# Patient Record
Sex: FEMALE | Race: OTHER | HISPANIC OR LATINO
[De-identification: names, ages, dates, MRNs, and addresses within clinical notes are randomized per-mention and may not be internally consistent; named-entity substitution may affect disease eponyms.]

---

## 2022-06-14 PROBLEM — Z00.00 ENCOUNTER FOR PREVENTIVE HEALTH EXAMINATION: Status: ACTIVE | Noted: 2022-06-14

## 2022-06-22 ENCOUNTER — APPOINTMENT (OUTPATIENT)
Dept: OBGYN | Facility: CLINIC | Age: 37
End: 2022-06-22
Payer: COMMERCIAL

## 2022-06-22 VITALS
WEIGHT: 129 LBS | SYSTOLIC BLOOD PRESSURE: 118 MMHG | DIASTOLIC BLOOD PRESSURE: 68 MMHG | BODY MASS INDEX: 24.35 KG/M2 | HEIGHT: 61 IN

## 2022-06-22 DIAGNOSIS — Z13.0 ENCOUNTER FOR SCREENING FOR DISEASES OF THE BLOOD AND BLOOD-FORMING ORGANS AND CERTAIN DISORDERS INVOLVING THE IMMUNE MECHANISM: ICD-10-CM

## 2022-06-22 DIAGNOSIS — Z01.84 ENCOUNTER FOR ANTIBODY RESPONSE EXAMINATION: ICD-10-CM

## 2022-06-22 DIAGNOSIS — Z13.21 ENCOUNTER FOR SCREENING FOR NUTRITIONAL DISORDER: ICD-10-CM

## 2022-06-22 DIAGNOSIS — R11.0 NAUSEA: ICD-10-CM

## 2022-06-22 DIAGNOSIS — Z12.4 ENCOUNTER FOR SCREENING FOR MALIGNANT NEOPLASM OF CERVIX: ICD-10-CM

## 2022-06-22 DIAGNOSIS — Z32.01 ENCOUNTER FOR PREGNANCY TEST, RESULT POSITIVE: ICD-10-CM

## 2022-06-22 DIAGNOSIS — Z13.29 ENCOUNTER FOR SCREENING FOR OTHER SUSPECTED ENDOCRINE DISORDER: ICD-10-CM

## 2022-06-22 DIAGNOSIS — Z13.1 ENCOUNTER FOR SCREENING FOR DIABETES MELLITUS: ICD-10-CM

## 2022-06-22 DIAGNOSIS — Z01.411 ENCOUNTER FOR GYNECOLOGICAL EXAMINATION (GENERAL) (ROUTINE) WITH ABNORMAL FINDINGS: ICD-10-CM

## 2022-06-22 DIAGNOSIS — Z13.88 ENCOUNTER FOR SCREENING FOR DISORDER DUE TO EXPOSURE TO CONTAMINANTS: ICD-10-CM

## 2022-06-22 DIAGNOSIS — Z11.51 ENCOUNTER FOR SCREENING FOR HUMAN PAPILLOMAVIRUS (HPV): ICD-10-CM

## 2022-06-22 DIAGNOSIS — N91.1 SECONDARY AMENORRHEA: ICD-10-CM

## 2022-06-22 DIAGNOSIS — Z11.3 ENCOUNTER FOR SCREENING FOR INFECTIONS WITH A PREDOMINANTLY SEXUAL MODE OF TRANSMISSION: ICD-10-CM

## 2022-06-22 DIAGNOSIS — Z12.39 ENCOUNTER FOR OTHER SCREENING FOR MALIGNANT NEOPLASM OF BREAST: ICD-10-CM

## 2022-06-22 PROCEDURE — 76817 TRANSVAGINAL US OBSTETRIC: CPT

## 2022-06-22 PROCEDURE — 99385 PREV VISIT NEW AGE 18-39: CPT

## 2022-06-22 PROCEDURE — 99203 OFFICE O/P NEW LOW 30 MIN: CPT | Mod: 25

## 2022-06-22 RX ORDER — PRENATAL NO.167/FOLIC ACID/DHA 0.4MG-25MG
0.4-25 TABLET,CHEWABLE ORAL
Refills: 0 | Status: ACTIVE | COMMUNITY

## 2022-06-22 NOTE — COUNSELING
[Nutrition/ Exercise/ Weight Management] : nutrition, exercise, weight management [Vitamins/Supplements] : vitamins/supplements [Breast Self Exam] : breast self exam [Contraception/ Emergency Contraception/ Safe Sexual Practices] : contraception, emergency contraception, safe sexual practices [Preconception Care/ Fertility options] : preconception care, fertility options [Pregnancy Options] : pregnancy options [Confidentiality] : confidentiality [STD (testing, results, tx)] : STD (testing, results, tx) [Lab Results] : lab results [Medication Management] : medication management [Other ___] : [unfilled]

## 2022-06-22 NOTE — PHYSICAL EXAM
[Chaperone Present] : A chaperone was present in the examining room during all aspects of the physical examination [FreeTextEntry1] : her partner was present for H&P as well as for exam [Appropriately responsive] : appropriately responsive [Alert] : alert [No Acute Distress] : no acute distress [No Lymphadenopathy] : no lymphadenopathy [Soft] : soft [Non-tender] : non-tender [Non-distended] : non-distended [No HSM] : No HSM [No Lesions] : no lesions [No Mass] : no mass [Oriented x3] : oriented x3 [Examination Of The Breasts] : a normal appearance [No Masses] : no breast masses were palpable [Labia Majora] : normal [Labia Minora] : normal [Normal] : normal [Uterine Adnexae] : normal

## 2022-06-22 NOTE — HISTORY OF PRESENT ILLNESS
[Patient reported PAP Smear was normal] : Patient reported PAP Smear was normal [Y] : Patient is sexually active [N] : Patient denies prior pregnancies [Frequency: Q ___ days] : menstrual periods occur approximately every [unfilled] days [No] : Patient does not have concerns regarding sex [Currently Active] : currently active [PapSmeardate] : 2021 [TextBox_31] : normal [LMPDate] : 5/4/22 [MensesFreq] : 31-61 [PGHxTotal] : 0 [TextBox_6] : 5/4/22 [TextBox_13] : 75-68 [FreeTextEntry1] : 5/4/22

## 2022-06-22 NOTE — PROCEDURE
[Cervical Pap Smear] : cervical Pap smear [Liquid Base] : liquid base [GC Chlamydia Culture] : GC Chlamydia Culture [Tolerated Well] : the patient tolerated the procedure well [No Complications] : there were no complications [Amenorrhea] : Amenorrhea [Transvaginal Ultrasound] : transvaginal ultrasound [FreeTextEntry3] : TVUS today shows single early iup CRL 7w0d c/w lmp dating +YS, +FHR

## 2022-06-22 NOTE — PLAN
[FreeTextEntry1] : 1.SECONDARY AMENORRHEA/EARLY IUP\par -TVUS today shows early IUP measuring c/w LMP of 5/4/22; Approx [place] of 2/8/23 ; +FHR, +YS, +FP\par - Early pregnancy precautions, practice /hospital info and folder provided to patient. \par -NOB blood panel, GCC cx, UCX, pap/hpv collected and sent at todays office visit. \par -Cont PNVs\par 2.GENETICS\par -SEMA4 expanded carrier screen drawn and sent at todays visit \par 3.AMA \par -Discussed NIPS, ultrascreen and sequential screening; Declines invasive testing at this time; aware of increased risk of aneuploidy with advanced age >35 \par -low dose ASA (81mg)x 1.5 starting @ 12 weeks discussed with for PEC prphlxis \par 4.NAUSEA\par  -small frequent meals advised \par -discussed Diclegis erx placed; pt able to tolerate po foods and fluids at this time\par 5. Pt denies hx at visit of STI; on review of her pt intake she noted +hx of "Herpes"; f/u @ [place] *most likely confidential information; possibility of starting Valrex @ 35 weeks if +HSV2 vaginal infections\par \par RTO 5 weeks for NOB, usc, NIPS, sono appt. \par \par During this visit 40 minutes were spent face-to-face with greater than 50% of the time dedicated to counseling.\par \par

## 2022-06-22 NOTE — REVIEW OF SYSTEMS
[Patient Intake Form Reviewed] : Patient intake form was reviewed [Fatigue] : fatigue [Nausea] : nausea [Negative] : Heme/Lymph [Vomiting] : no vomiting

## 2022-06-23 LAB
BASOPHILS # BLD AUTO: 0.02 K/UL
BASOPHILS NFR BLD AUTO: 0.2 %
EOSINOPHIL # BLD AUTO: 0.03 K/UL
EOSINOPHIL NFR BLD AUTO: 0.4 %
HCT VFR BLD CALC: 42.5 %
HGB BLD-MCNC: 13 G/DL
IMM GRANULOCYTES NFR BLD AUTO: 0.4 %
LYMPHOCYTES # BLD AUTO: 1.71 K/UL
LYMPHOCYTES NFR BLD AUTO: 20.1 %
MAN DIFF?: NORMAL
MCHC RBC-ENTMCNC: 29.3 PG
MCHC RBC-ENTMCNC: 30.6 GM/DL
MCV RBC AUTO: 95.9 FL
MONOCYTES # BLD AUTO: 0.56 K/UL
MONOCYTES NFR BLD AUTO: 6.6 %
NEUTROPHILS # BLD AUTO: 6.14 K/UL
NEUTROPHILS NFR BLD AUTO: 72.3 %
PLATELET # BLD AUTO: 266 K/UL
RBC # BLD: 4.43 M/UL
RBC # FLD: 14.6 %
WBC # FLD AUTO: 8.49 K/UL

## 2022-06-27 LAB
25(OH)D3 SERPL-MCNC: 32.6 NG/ML
ABO + RH PNL BLD: NORMAL
B19V IGG SER QL IA: 7.95 INDEX
B19V IGG+IGM SER-IMP: NORMAL
B19V IGG+IGM SER-IMP: POSITIVE
B19V IGM FLD-ACNC: 0.15 INDEX
B19V IGM SER-ACNC: NEGATIVE
BACTERIA UR CULT: NORMAL
BLD GP AB SCN SERPL QL: NORMAL
C TRACH RRNA SPEC QL NAA+PROBE: NOT DETECTED
ESTIMATED AVERAGE GLUCOSE: 100 MG/DL
HBA1C MFR BLD HPLC: 5.1 %
HBV SURFACE AG SER QL: NONREACTIVE
HGB A MFR BLD: 97.5 %
HGB A2 MFR BLD: 2.5 %
HGB FRACT BLD-IMP: NORMAL
HIV1+2 AB SPEC QL IA.RAPID: NONREACTIVE
HPV HIGH+LOW RISK DNA PNL CVX: NOT DETECTED
LEAD BLD-MCNC: <1 UG/DL
MEV IGG FLD QL IA: 144 AU/ML
MEV IGG+IGM SER-IMP: POSITIVE
MUV AB SER-ACNC: POSITIVE
MUV IGG SER QL IA: 102 AU/ML
N GONORRHOEA RRNA SPEC QL NAA+PROBE: NOT DETECTED
RUBV IGG FLD-ACNC: 7.1 INDEX
RUBV IGG SER-IMP: POSITIVE
SOURCE AMPLIFICATION: NORMAL
T PALLIDUM AB SER QL IA: NEGATIVE
TSH SERPL-ACNC: 1.85 UIU/ML
VZV AB TITR SER: POSITIVE
VZV IGG SER IF-ACNC: 948 INDEX

## 2022-06-29 DIAGNOSIS — O23.599 INFECTION OF OTHER PART OF GENITAL TRACT IN PREGNANCY, UNSPECIFIED TRIMESTER: ICD-10-CM

## 2022-06-29 DIAGNOSIS — B96.89 INFECTION OF OTHER PART OF GENITAL TRACT IN PREGNANCY, UNSPECIFIED TRIMESTER: ICD-10-CM

## 2022-06-29 LAB — CYTOLOGY CVX/VAG DOC THIN PREP: ABNORMAL

## 2022-06-30 RX ORDER — METRONIDAZOLE 7.5 MG/G
0.75 GEL VAGINAL
Qty: 1 | Refills: 0 | Status: ACTIVE | COMMUNITY
Start: 2022-06-29 | End: 1900-01-01

## 2022-07-20 DIAGNOSIS — B37.9 CANDIDIASIS, UNSPECIFIED: ICD-10-CM

## 2022-07-28 ENCOUNTER — ASOB RESULT (OUTPATIENT)
Age: 37
End: 2022-07-28

## 2022-07-28 ENCOUNTER — APPOINTMENT (OUTPATIENT)
Dept: OBGYN | Facility: CLINIC | Age: 37
End: 2022-07-28

## 2022-07-28 DIAGNOSIS — Z34.01 ENCOUNTER FOR SUPERVISION OF NORMAL FIRST PREGNANCY, FIRST TRIMESTER: ICD-10-CM

## 2022-07-28 PROCEDURE — 0500F INITIAL PRENATAL CARE VISIT: CPT

## 2022-07-28 PROCEDURE — 36415 COLL VENOUS BLD VENIPUNCTURE: CPT

## 2022-07-28 PROCEDURE — 76813 OB US NUCHAL MEAS 1 GEST: CPT

## 2022-07-28 PROCEDURE — 76801 OB US < 14 WKS SINGLE FETUS: CPT | Mod: 59

## 2022-08-01 ENCOUNTER — NON-APPOINTMENT (OUTPATIENT)
Age: 37
End: 2022-08-01

## 2022-08-01 ENCOUNTER — TRANSCRIPTION ENCOUNTER (OUTPATIENT)
Age: 37
End: 2022-08-01

## 2022-08-02 ENCOUNTER — NON-APPOINTMENT (OUTPATIENT)
Age: 37
End: 2022-08-02

## 2022-08-03 ENCOUNTER — NON-APPOINTMENT (OUTPATIENT)
Age: 37
End: 2022-08-03

## 2022-08-17 ENCOUNTER — NON-APPOINTMENT (OUTPATIENT)
Age: 37
End: 2022-08-17

## 2022-08-25 ENCOUNTER — APPOINTMENT (OUTPATIENT)
Dept: OBGYN | Facility: CLINIC | Age: 37
End: 2022-08-25

## 2022-08-25 PROCEDURE — 0502F SUBSEQUENT PRENATAL CARE: CPT

## 2022-08-25 PROCEDURE — 36415 COLL VENOUS BLD VENIPUNCTURE: CPT

## 2022-08-29 ENCOUNTER — NON-APPOINTMENT (OUTPATIENT)
Age: 37
End: 2022-08-29

## 2022-09-01 ENCOUNTER — TRANSCRIPTION ENCOUNTER (OUTPATIENT)
Age: 37
End: 2022-09-01

## 2022-09-01 LAB — AFP PNL SERPL: NORMAL

## 2022-09-15 ENCOUNTER — ASOB RESULT (OUTPATIENT)
Age: 37
End: 2022-09-15

## 2022-09-15 ENCOUNTER — APPOINTMENT (OUTPATIENT)
Dept: ANTEPARTUM | Facility: CLINIC | Age: 37
End: 2022-09-15

## 2022-09-15 PROCEDURE — 76811 OB US DETAILED SNGL FETUS: CPT

## 2022-09-16 ENCOUNTER — TRANSCRIPTION ENCOUNTER (OUTPATIENT)
Age: 37
End: 2022-09-16

## 2022-09-21 ENCOUNTER — NON-APPOINTMENT (OUTPATIENT)
Age: 37
End: 2022-09-21

## 2022-09-22 ENCOUNTER — NON-APPOINTMENT (OUTPATIENT)
Age: 37
End: 2022-09-22

## 2022-09-22 ENCOUNTER — APPOINTMENT (OUTPATIENT)
Dept: OBGYN | Facility: CLINIC | Age: 37
End: 2022-09-22

## 2022-09-22 DIAGNOSIS — Z34.02 ENCOUNTER FOR SUPERVISION OF NORMAL FIRST PREGNANCY, SECOND TRIMESTER: ICD-10-CM

## 2022-09-22 PROCEDURE — 0502F SUBSEQUENT PRENATAL CARE: CPT

## 2022-10-05 ENCOUNTER — TRANSCRIPTION ENCOUNTER (OUTPATIENT)
Age: 37
End: 2022-10-05

## 2022-10-24 ENCOUNTER — NON-APPOINTMENT (OUTPATIENT)
Age: 37
End: 2022-10-24

## 2022-10-25 RX ORDER — ONDANSETRON 4 MG/1
4 TABLET, ORALLY DISINTEGRATING ORAL EVERY 6 HOURS
Qty: 20 | Refills: 1 | Status: ACTIVE | COMMUNITY
Start: 2022-10-25 | End: 1900-01-01

## 2022-10-31 ENCOUNTER — NON-APPOINTMENT (OUTPATIENT)
Age: 37
End: 2022-10-31

## 2022-11-03 ENCOUNTER — APPOINTMENT (OUTPATIENT)
Dept: OBGYN | Facility: CLINIC | Age: 37
End: 2022-11-03

## 2022-11-03 DIAGNOSIS — N39.0 URINARY TRACT INFECTION, SITE NOT SPECIFIED: ICD-10-CM

## 2022-11-03 DIAGNOSIS — Z23 ENCOUNTER FOR IMMUNIZATION: ICD-10-CM

## 2022-11-03 PROCEDURE — 36415 COLL VENOUS BLD VENIPUNCTURE: CPT

## 2022-11-03 PROCEDURE — 90471 IMMUNIZATION ADMIN: CPT

## 2022-11-03 PROCEDURE — 90686 IIV4 VACC NO PRSV 0.5 ML IM: CPT

## 2022-11-03 PROCEDURE — 0502F SUBSEQUENT PRENATAL CARE: CPT

## 2022-11-04 LAB
25(OH)D3 SERPL-MCNC: 36 NG/ML
BASOPHILS # BLD AUTO: 0.03 K/UL
BASOPHILS NFR BLD AUTO: 0.3 %
BLD GP AB SCN SERPL QL: NORMAL
EOSINOPHIL # BLD AUTO: 0.11 K/UL
EOSINOPHIL NFR BLD AUTO: 1.1 %
FERRITIN SERPL-MCNC: 42 NG/ML
GLUCOSE 1H P 50 G GLC PO SERPL-MCNC: 154 MG/DL
HCT VFR BLD CALC: 36.2 %
HGB BLD-MCNC: 11.9 G/DL
HIV1+2 AB SPEC QL IA.RAPID: NONREACTIVE
IMM GRANULOCYTES NFR BLD AUTO: 0.6 %
LYMPHOCYTES # BLD AUTO: 1.6 K/UL
LYMPHOCYTES NFR BLD AUTO: 15.9 %
MAN DIFF?: NORMAL
MCHC RBC-ENTMCNC: 29.6 PG
MCHC RBC-ENTMCNC: 32.9 GM/DL
MCV RBC AUTO: 90 FL
MONOCYTES # BLD AUTO: 0.54 K/UL
MONOCYTES NFR BLD AUTO: 5.4 %
NEUTROPHILS # BLD AUTO: 7.74 K/UL
NEUTROPHILS NFR BLD AUTO: 76.7 %
PLATELET # BLD AUTO: 295 K/UL
RBC # BLD: 4.02 M/UL
RBC # FLD: 13.7 %
T PALLIDUM AB SER QL IA: NEGATIVE
WBC # FLD AUTO: 10.08 K/UL

## 2022-11-06 ENCOUNTER — TRANSCRIPTION ENCOUNTER (OUTPATIENT)
Age: 37
End: 2022-11-06

## 2022-11-06 LAB — BACTERIA UR CULT: NORMAL

## 2022-11-07 ENCOUNTER — APPOINTMENT (OUTPATIENT)
Dept: UROGYNECOLOGY | Facility: CLINIC | Age: 37
End: 2022-11-07

## 2022-11-07 ENCOUNTER — TRANSCRIPTION ENCOUNTER (OUTPATIENT)
Age: 37
End: 2022-11-07

## 2022-11-07 PROCEDURE — 99204 OFFICE O/P NEW MOD 45 MIN: CPT

## 2022-11-07 NOTE — DISCUSSION/SUMMARY
[FreeTextEntry1] : \par Aleyda presents with a skenes abscess vs infected urethral diverticulum. \par -Cultures collected\par -Recommend antibiotic treatment. Discussed with her OB, Dr Rodriguez. Will give Augmentin 865mg bid x 7 days\par -Probiotic daily while on abx\par -RTO in 2 weeks for follow up and repeat exam

## 2022-11-07 NOTE — PHYSICAL EXAM
[Chaperone Present] : A chaperone was present in the examining room during all aspects of the physical examination [FreeTextEntry1] : General: Well, appearing. Alert and orientated. No acute distress\par HEENT: Normocephalic, atraumatic and extraocular muscles appear to be intact \par Neck: Full range of motion, no obvious lymphadenopathy, deformities, or masses noted \par Respiratory: Speaking in full sentences comfortably, normal work of breathing and no cough during visit\par Musculoskeletal: active full range of motion in extremities \par Extremities: No upper extremity edema noted\par Skin: no obvious rash or skin lesions\par Neuro: Orientated X 3, speech is fluent, normal rate\par Psych: Normal mood and affect \par  \par \par EXAM performed with sterile gloves [Tenderness] : ~T no ~M abdominal tenderness observed [Distended] : not distended [FreeTextEntry3] : 3cm periurethral cyst, erythematous. purulent drainage from meatus with compression of cyst; culture collected

## 2022-11-07 NOTE — HISTORY OF PRESENT ILLNESS
[] : days ago [Unable To Restrain Bowel Movement] : no [Urinary Tract Infection] : no [FreeTextEntry5] : painful to the touch [de-identified] : 7 [FreeTextEntry1] : Aleyda is a 37 old, currently 26 weeks pregnant, referred for evaluation of vaginal cyst.  She first noted the cyst about one week ago.  It is painful to the touch and causes pain during urination and bowel movements.  She denies any vaginal bleeding.  Denies urgency, UUI, RINA.  Of note, during pregnancy, she has had BV and yeast infections.

## 2022-11-08 ENCOUNTER — NON-APPOINTMENT (OUTPATIENT)
Age: 37
End: 2022-11-08

## 2022-11-09 DIAGNOSIS — R73.09 OTHER ABNORMAL GLUCOSE: ICD-10-CM

## 2022-11-10 ENCOUNTER — NON-APPOINTMENT (OUTPATIENT)
Age: 37
End: 2022-11-10

## 2022-11-21 ENCOUNTER — APPOINTMENT (OUTPATIENT)
Dept: UROGYNECOLOGY | Facility: CLINIC | Age: 37
End: 2022-11-21

## 2022-11-21 PROCEDURE — 99213 OFFICE O/P EST LOW 20 MIN: CPT

## 2022-11-21 NOTE — DISCUSSION/SUMMARY
[FreeTextEntry1] : \par Exam findings and culture results reviewed. Symptoms and clinical exam improved s/p antibiotics. No sign of further infection on exam. Precautions reviewed. Will RTO post-partum for repeat evaluation and surgical planning if desired at that time.

## 2022-11-22 ENCOUNTER — TRANSCRIPTION ENCOUNTER (OUTPATIENT)
Age: 37
End: 2022-11-22

## 2022-12-01 ENCOUNTER — NON-APPOINTMENT (OUTPATIENT)
Age: 37
End: 2022-12-01

## 2022-12-01 ENCOUNTER — APPOINTMENT (OUTPATIENT)
Dept: OBGYN | Facility: CLINIC | Age: 37
End: 2022-12-01

## 2022-12-01 PROCEDURE — 0502F SUBSEQUENT PRENATAL CARE: CPT

## 2022-12-01 PROCEDURE — 90715 TDAP VACCINE 7 YRS/> IM: CPT

## 2022-12-01 PROCEDURE — 90471 IMMUNIZATION ADMIN: CPT

## 2022-12-02 ENCOUNTER — TRANSCRIPTION ENCOUNTER (OUTPATIENT)
Age: 37
End: 2022-12-02

## 2022-12-02 LAB
CANDIDA VAG CYTO: DETECTED
G VAGINALIS+PREV SP MTYP VAG QL MICRO: NOT DETECTED
T VAGINALIS VAG QL WET PREP: NOT DETECTED

## 2022-12-05 ENCOUNTER — TRANSCRIPTION ENCOUNTER (OUTPATIENT)
Age: 37
End: 2022-12-05

## 2022-12-15 ENCOUNTER — NON-APPOINTMENT (OUTPATIENT)
Age: 37
End: 2022-12-15

## 2022-12-15 LAB
A VAGINAE DNA VAG QL NAA+PROBE: NORMAL
BVAB2 DNA VAG QL NAA+PROBE: NORMAL
C KRUSEI DNA VAG QL NAA+PROBE: NEGATIVE
MEGA1 DNA VAG QL NAA+PROBE: ABNORMAL
MYCOPLASMA HOMINIS CULTURE: NEGATIVE
T VAGINALIS RRNA SPEC QL NAA+PROBE: NEGATIVE
UREAPLASMA CULTURE: POSITIVE

## 2022-12-16 ENCOUNTER — TRANSCRIPTION ENCOUNTER (OUTPATIENT)
Age: 37
End: 2022-12-16

## 2022-12-21 ENCOUNTER — APPOINTMENT (OUTPATIENT)
Dept: OBGYN | Facility: CLINIC | Age: 37
End: 2022-12-21

## 2022-12-21 ENCOUNTER — NON-APPOINTMENT (OUTPATIENT)
Age: 37
End: 2022-12-21

## 2022-12-21 ENCOUNTER — ASOB RESULT (OUTPATIENT)
Age: 37
End: 2022-12-21

## 2022-12-21 PROCEDURE — 76816 OB US FOLLOW-UP PER FETUS: CPT

## 2022-12-21 PROCEDURE — 76819 FETAL BIOPHYS PROFIL W/O NST: CPT | Mod: 59

## 2022-12-21 PROCEDURE — 0502F SUBSEQUENT PRENATAL CARE: CPT

## 2022-12-22 ENCOUNTER — NON-APPOINTMENT (OUTPATIENT)
Age: 37
End: 2022-12-22

## 2022-12-23 ENCOUNTER — APPOINTMENT (OUTPATIENT)
Dept: UROGYNECOLOGY | Facility: CLINIC | Age: 37
End: 2022-12-23

## 2022-12-23 PROCEDURE — 99214 OFFICE O/P EST MOD 30 MIN: CPT

## 2022-12-23 RX ORDER — AMOXICILLIN AND CLAVULANATE POTASSIUM 875; 125 MG/1; MG/1
875-125 TABLET, COATED ORAL
Qty: 14 | Refills: 0 | Status: ACTIVE | COMMUNITY
Start: 2022-12-23 | End: 1900-01-01

## 2022-12-23 NOTE — PROCEDURE
[Other ___] : [unfilled] [Specify ___] : with [unfilled] [Patient] : the patient [Consent Obtained] : written consent was obtained prior to the procedure and is detailed in the patient's record [Allergies Reviewed] : Allergies reviewed [Betadine] : betadine [No Complications] : none [Tolerated Well] : the patient tolerated the procedure well [Post procedure instructions and information given] : post procedure instructions and information given and reviewed with patient. [0] : 0 [FreeTextEntry1] : 18 gauge needle inserted on posterior lateral aspect of cyst, away from urethra. Needle inserted less than 1mm into cyst. 30 cc brown tinged fluid drained. Patient with immediate relief of pain

## 2022-12-23 NOTE — HISTORY OF PRESENT ILLNESS
[FreeTextEntry1] : Mrs. Canas represents to the urogynecology office today with concern for a recurrent abscess. Pt is currently 33 weeks pregnant. \par . \par Pt was initially seen on 11/07/22 with a  [place]'s abscess vs infected urethral diverticulum. Patient was given 7 days of Augmentin with resolution of symptoms on follow up evaluation.   \par Pt reports the symptoms started 2 days with significant increase in size of cyst, severe pain (unable to sit comfortably), pain worse with urination. Drainage of scant brown fluid noted.Pt has not tried any treatment including warm compresses. She otherwise feels well. Denies fevers, chills, myalgias. Pt reports feeling fetal movement, denies vaginal bleeding and does not have specific OB concerns at this time.  \par

## 2022-12-23 NOTE — DISCUSSION/SUMMARY
[FreeTextEntry1] : \par \par - I and D preformed in office as a sterile procedure \par - Culture collected. Will f/u results. \par -Discussed case with Dr Rodriguez, patients OB. Will give Augmentin x 7 days and f/u results of culture. She will RTO 1/6 for follow up, or sooner if issues arise. Precautions reviewed extensively. \par \par \par \par

## 2022-12-23 NOTE — PHYSICAL EXAM
[Chaperone Present] : A chaperone was present in the examining room during all aspects of the physical examination [No Acute Distress] : in no acute distress [Well developed] : well developed [Well Nourished] : ~L well nourished [Good Hygeine] : demonstrates good hygeine [Oriented x3] : oriented to person, place, and time [Normal Memory] : ~T memory was ~L unimpaired [Normal Mood/Affect] : mood and affect are normal [Warm and Dry] : was warm and dry to touch [Labia Majora] : were normal [Labia Minora] : were normal [Normal] : was normal [Normal Appearance] : general appearance was normal [Tenderness] : ~T no ~M abdominal tenderness observed [FreeTextEntry3] : 4cm periurethral cyst with drainage of brown tinged fluid  on compression of cyst. No purulent drainage, fluid c/w old blood. Culture obtained. Significant tenderness on exam

## 2023-01-01 ENCOUNTER — NON-APPOINTMENT (OUTPATIENT)
Age: 38
End: 2023-01-01

## 2023-01-01 ENCOUNTER — TRANSCRIPTION ENCOUNTER (OUTPATIENT)
Age: 38
End: 2023-01-01

## 2023-01-05 ENCOUNTER — TRANSCRIPTION ENCOUNTER (OUTPATIENT)
Age: 38
End: 2023-01-05

## 2023-01-05 RX ORDER — DOXYLAMINE SUCCINATE AND PYRIDOXINE HYDROCHLORIDE 10; 10 MG/1; MG/1
10-10 TABLET, DELAYED RELEASE ORAL
Qty: 90 | Refills: 1 | Status: ACTIVE | COMMUNITY
Start: 2022-06-22 | End: 1900-01-01

## 2023-01-06 ENCOUNTER — APPOINTMENT (OUTPATIENT)
Dept: UROGYNECOLOGY | Facility: CLINIC | Age: 38
End: 2023-01-06
Payer: COMMERCIAL

## 2023-01-06 PROCEDURE — 99214 OFFICE O/P EST MOD 30 MIN: CPT

## 2023-01-06 NOTE — PHYSICAL EXAM
[Exam Deferred] : was deferred [FreeTextEntry1] : General: Well, appearing. Alert and orientated. No acute distress\par HEENT: Normocephalic, atraumatic and extraocular muscles appear to be intact \par Neck: Full range of motion, no obvious lymphadenopathy, deformities, or masses noted \par Respiratory: Speaking in full sentences comfortably, normal work of breathing and no cough during visit\par Musculoskeletal: active full range of motion in extremities \par Extremities: No upper extremity edema noted\par Skin: no obvious rash or skin lesions\par Neuro: Orientated X 3, speech is fluent, normal rate\par Psych: Normal mood and affect\par  [FreeTextEntry4] : 2 cm periurethral cyst, nontender, no purulent discharge per urethra with cyst compression, no erythmea or tenderness of infection

## 2023-01-06 NOTE — HISTORY OF PRESENT ILLNESS
[FreeTextEntry1] : Aleyda is a 38yo  at [facility] of 35 weeks who presents for follow up on urethral diverticulum/abscess. At her last visit (2022), she had I&D of this with 30cc of fluid expelled. This was sent for culture which grew Coag negative staphylococcus and Prevotella Brivia which was susceptible to Augmentin. She took Augmentin x 7 days after procedure and she presents today for follow up. She reports symptoms improved after I&D and continued to improve with the antibiotics. She know feels a cyst however denies symptoms of an infection.  She denies pain/discomfort, fevers, dysuria, urinary complaints.

## 2023-01-06 NOTE — DISCUSSION/SUMMARY
[FreeTextEntry1] : Urethral diverticulum, h/o abscess\par -No signs of infection today\par -Recommended pelvic rest\par -Will continue to follow up with patient on weekly basis and plan for drainage prior to delivery and again as needed \par

## 2023-01-11 ENCOUNTER — APPOINTMENT (OUTPATIENT)
Dept: UROGYNECOLOGY | Facility: CLINIC | Age: 38
End: 2023-01-11

## 2023-01-12 ENCOUNTER — APPOINTMENT (OUTPATIENT)
Dept: OBGYN | Facility: CLINIC | Age: 38
End: 2023-01-12
Payer: COMMERCIAL

## 2023-01-12 VITALS
DIASTOLIC BLOOD PRESSURE: 74 MMHG | HEIGHT: 61 IN | BODY MASS INDEX: 24.55 KG/M2 | WEIGHT: 130 LBS | SYSTOLIC BLOOD PRESSURE: 116 MMHG

## 2023-01-12 DIAGNOSIS — B00.9 HERPESVIRAL INFECTION, UNSPECIFIED: ICD-10-CM

## 2023-01-12 DIAGNOSIS — Z36.85 ENCOUNTER FOR ANTENATAL SCREENING FOR STREPTOCOCCUS B: ICD-10-CM

## 2023-01-12 PROCEDURE — 0502F SUBSEQUENT PRENATAL CARE: CPT

## 2023-01-12 RX ORDER — VALACYCLOVIR 500 MG/1
500 TABLET, FILM COATED ORAL DAILY
Qty: 45 | Refills: 0 | Status: ACTIVE | COMMUNITY
Start: 2023-01-12 | End: 1900-01-01

## 2023-01-13 ENCOUNTER — NON-APPOINTMENT (OUTPATIENT)
Age: 38
End: 2023-01-13

## 2023-01-14 LAB
GP B STREP DNA SPEC QL NAA+PROBE: DETECTED
SOURCE GBS: NORMAL

## 2023-01-17 ENCOUNTER — NON-APPOINTMENT (OUTPATIENT)
Age: 38
End: 2023-01-17

## 2023-01-17 ENCOUNTER — APPOINTMENT (OUTPATIENT)
Dept: OBGYN | Facility: CLINIC | Age: 38
End: 2023-01-17
Payer: COMMERCIAL

## 2023-01-17 VITALS
SYSTOLIC BLOOD PRESSURE: 112 MMHG | DIASTOLIC BLOOD PRESSURE: 68 MMHG | BODY MASS INDEX: 24.55 KG/M2 | WEIGHT: 130 LBS | HEIGHT: 61 IN

## 2023-01-17 PROCEDURE — 0502F SUBSEQUENT PRENATAL CARE: CPT

## 2023-01-20 ENCOUNTER — NON-APPOINTMENT (OUTPATIENT)
Age: 38
End: 2023-01-20

## 2023-01-21 ENCOUNTER — NON-APPOINTMENT (OUTPATIENT)
Age: 38
End: 2023-01-21

## 2023-01-26 ENCOUNTER — NON-APPOINTMENT (OUTPATIENT)
Age: 38
End: 2023-01-26

## 2023-01-26 ENCOUNTER — APPOINTMENT (OUTPATIENT)
Dept: OBGYN | Facility: CLINIC | Age: 38
End: 2023-01-26
Payer: COMMERCIAL

## 2023-01-26 DIAGNOSIS — Z34.03 ENCOUNTER FOR SUPERVISION OF NORMAL FIRST PREGNANCY, THIRD TRIMESTER: ICD-10-CM

## 2023-01-26 PROCEDURE — 0502F SUBSEQUENT PRENATAL CARE: CPT

## 2023-01-27 ENCOUNTER — NON-APPOINTMENT (OUTPATIENT)
Age: 38
End: 2023-01-27

## 2023-02-02 ENCOUNTER — APPOINTMENT (OUTPATIENT)
Dept: OBGYN | Facility: CLINIC | Age: 38
End: 2023-02-02
Payer: COMMERCIAL

## 2023-02-02 PROCEDURE — 0502F SUBSEQUENT PRENATAL CARE: CPT

## 2023-02-03 ENCOUNTER — TRANSCRIPTION ENCOUNTER (OUTPATIENT)
Age: 38
End: 2023-02-03

## 2023-02-03 ENCOUNTER — NON-APPOINTMENT (OUTPATIENT)
Age: 38
End: 2023-02-03

## 2023-02-04 ENCOUNTER — NON-APPOINTMENT (OUTPATIENT)
Age: 38
End: 2023-02-04

## 2023-02-04 ENCOUNTER — TRANSCRIPTION ENCOUNTER (OUTPATIENT)
Age: 38
End: 2023-02-04

## 2023-02-04 ENCOUNTER — INPATIENT (INPATIENT)
Facility: HOSPITAL | Age: 38
LOS: 6 days | Discharge: HOME | End: 2023-02-11
Attending: OBSTETRICS & GYNECOLOGY | Admitting: OBSTETRICS & GYNECOLOGY
Payer: COMMERCIAL

## 2023-02-04 VITALS — OXYGEN SATURATION: 100 %

## 2023-02-04 DIAGNOSIS — O26.899 OTHER SPECIFIED PREGNANCY RELATED CONDITIONS, UNSPECIFIED TRIMESTER: ICD-10-CM

## 2023-02-04 DIAGNOSIS — Z34.80 ENCOUNTER FOR SUPERVISION OF OTHER NORMAL PREGNANCY, UNSPECIFIED TRIMESTER: ICD-10-CM

## 2023-02-04 LAB
BASOPHILS # BLD AUTO: 0.01 K/UL — SIGNIFICANT CHANGE UP (ref 0–0.2)
BASOPHILS NFR BLD AUTO: 0.1 % — SIGNIFICANT CHANGE UP (ref 0–2)
BLD GP AB SCN SERPL QL: NEGATIVE — SIGNIFICANT CHANGE UP
EOSINOPHIL # BLD AUTO: 0.08 K/UL — SIGNIFICANT CHANGE UP (ref 0–0.5)
EOSINOPHIL NFR BLD AUTO: 1 % — SIGNIFICANT CHANGE UP (ref 0–6)
HCT VFR BLD CALC: 37.9 % — SIGNIFICANT CHANGE UP (ref 34.5–45)
HGB BLD-MCNC: 12.4 G/DL — SIGNIFICANT CHANGE UP (ref 11.5–15.5)
IMM GRANULOCYTES NFR BLD AUTO: 0.4 % — SIGNIFICANT CHANGE UP (ref 0–0.9)
LYMPHOCYTES # BLD AUTO: 1.64 K/UL — SIGNIFICANT CHANGE UP (ref 1–3.3)
LYMPHOCYTES # BLD AUTO: 21.1 % — SIGNIFICANT CHANGE UP (ref 13–44)
MCHC RBC-ENTMCNC: 27.1 PG — SIGNIFICANT CHANGE UP (ref 27–34)
MCHC RBC-ENTMCNC: 32.7 GM/DL — SIGNIFICANT CHANGE UP (ref 32–36)
MCV RBC AUTO: 82.9 FL — SIGNIFICANT CHANGE UP (ref 80–100)
MONOCYTES # BLD AUTO: 0.65 K/UL — SIGNIFICANT CHANGE UP (ref 0–0.9)
MONOCYTES NFR BLD AUTO: 8.4 % — SIGNIFICANT CHANGE UP (ref 2–14)
NEUTROPHILS # BLD AUTO: 5.36 K/UL — SIGNIFICANT CHANGE UP (ref 1.8–7.4)
NEUTROPHILS NFR BLD AUTO: 69 % — SIGNIFICANT CHANGE UP (ref 43–77)
NRBC # BLD: 0 /100 WBCS — SIGNIFICANT CHANGE UP (ref 0–0)
PLATELET # BLD AUTO: 255 K/UL — SIGNIFICANT CHANGE UP (ref 150–400)
RBC # BLD: 4.57 M/UL — SIGNIFICANT CHANGE UP (ref 3.8–5.2)
RBC # FLD: 15.1 % — HIGH (ref 10.3–14.5)
RH IG SCN BLD-IMP: POSITIVE — SIGNIFICANT CHANGE UP
RH IG SCN BLD-IMP: POSITIVE — SIGNIFICANT CHANGE UP
SARS-COV-2 RNA SPEC QL NAA+PROBE: SIGNIFICANT CHANGE UP
WBC # BLD: 7.77 K/UL — SIGNIFICANT CHANGE UP (ref 3.8–10.5)
WBC # FLD AUTO: 7.77 K/UL — SIGNIFICANT CHANGE UP (ref 3.8–10.5)

## 2023-02-04 PROCEDURE — 59510 CESAREAN DELIVERY: CPT

## 2023-02-04 RX ORDER — SODIUM CHLORIDE 9 MG/ML
1000 INJECTION, SOLUTION INTRAVENOUS
Refills: 0 | Status: DISCONTINUED | OUTPATIENT
Start: 2023-02-04 | End: 2023-02-04

## 2023-02-04 RX ORDER — OXYTOCIN 10 UNIT/ML
333.33 VIAL (ML) INJECTION
Qty: 20 | Refills: 0 | Status: DISCONTINUED | OUTPATIENT
Start: 2023-02-04 | End: 2023-02-06

## 2023-02-04 RX ORDER — KETOROLAC TROMETHAMINE 30 MG/ML
30 SYRINGE (ML) INJECTION EVERY 6 HOURS
Refills: 0 | Status: DISCONTINUED | OUTPATIENT
Start: 2023-02-04 | End: 2023-02-05

## 2023-02-04 RX ORDER — DIPHENHYDRAMINE HCL 50 MG
25 CAPSULE ORAL EVERY 4 HOURS
Refills: 0 | Status: DISCONTINUED | OUTPATIENT
Start: 2023-02-05 | End: 2023-02-05

## 2023-02-04 RX ORDER — NALOXONE HYDROCHLORIDE 4 MG/.1ML
0.1 SPRAY NASAL
Refills: 0 | Status: DISCONTINUED | OUTPATIENT
Start: 2023-02-05 | End: 2023-02-05

## 2023-02-04 RX ORDER — MORPHINE SULFATE 50 MG/1
0.1 CAPSULE, EXTENDED RELEASE ORAL ONCE
Refills: 0 | Status: DISCONTINUED | OUTPATIENT
Start: 2023-02-05 | End: 2023-02-05

## 2023-02-04 RX ORDER — AMPICILLIN TRIHYDRATE 250 MG
2 CAPSULE ORAL ONCE
Refills: 0 | Status: COMPLETED | OUTPATIENT
Start: 2023-02-04 | End: 2023-02-04

## 2023-02-04 RX ORDER — HEPARIN SODIUM 5000 [USP'U]/ML
5000 INJECTION INTRAVENOUS; SUBCUTANEOUS EVERY 12 HOURS
Refills: 0 | Status: DISCONTINUED | OUTPATIENT
Start: 2023-02-04 | End: 2023-02-05

## 2023-02-04 RX ORDER — CITRIC ACID/SODIUM CITRATE 300-500 MG
15 SOLUTION, ORAL ORAL EVERY 6 HOURS
Refills: 0 | Status: DISCONTINUED | OUTPATIENT
Start: 2023-02-04 | End: 2023-02-04

## 2023-02-04 RX ORDER — OXYCODONE HYDROCHLORIDE 5 MG/1
5 TABLET ORAL ONCE
Refills: 0 | Status: DISCONTINUED | OUTPATIENT
Start: 2023-02-04 | End: 2023-02-05

## 2023-02-04 RX ORDER — IBUPROFEN 200 MG
600 TABLET ORAL EVERY 6 HOURS
Refills: 0 | Status: DISCONTINUED | OUTPATIENT
Start: 2023-02-04 | End: 2023-02-05

## 2023-02-04 RX ORDER — OXYCODONE HYDROCHLORIDE 5 MG/1
5 TABLET ORAL
Refills: 0 | Status: DISCONTINUED | OUTPATIENT
Start: 2023-02-04 | End: 2023-02-05

## 2023-02-04 RX ORDER — AMPICILLIN TRIHYDRATE 250 MG
1 CAPSULE ORAL EVERY 4 HOURS
Refills: 0 | Status: DISCONTINUED | OUTPATIENT
Start: 2023-02-04 | End: 2023-02-05

## 2023-02-04 RX ORDER — TETANUS TOXOID, REDUCED DIPHTHERIA TOXOID AND ACELLULAR PERTUSSIS VACCINE, ADSORBED 5; 2.5; 8; 8; 2.5 [IU]/.5ML; [IU]/.5ML; UG/.5ML; UG/.5ML; UG/.5ML
0.5 SUSPENSION INTRAMUSCULAR ONCE
Refills: 0 | Status: DISCONTINUED | OUTPATIENT
Start: 2023-02-04 | End: 2023-02-05

## 2023-02-04 RX ORDER — DEXAMETHASONE 0.5 MG/5ML
4 ELIXIR ORAL EVERY 6 HOURS
Refills: 0 | Status: DISCONTINUED | OUTPATIENT
Start: 2023-02-05 | End: 2023-02-05

## 2023-02-04 RX ORDER — SODIUM CHLORIDE 9 MG/ML
1000 INJECTION, SOLUTION INTRAVENOUS
Refills: 0 | Status: DISCONTINUED | OUTPATIENT
Start: 2023-02-04 | End: 2023-02-05

## 2023-02-04 RX ORDER — CHLORHEXIDINE GLUCONATE 213 G/1000ML
1 SOLUTION TOPICAL ONCE
Refills: 0 | Status: DISCONTINUED | OUTPATIENT
Start: 2023-02-04 | End: 2023-02-05

## 2023-02-04 RX ORDER — ONDANSETRON 8 MG/1
4 TABLET, FILM COATED ORAL EVERY 6 HOURS
Refills: 0 | Status: DISCONTINUED | OUTPATIENT
Start: 2023-02-05 | End: 2023-02-05

## 2023-02-04 RX ORDER — NALBUPHINE HYDROCHLORIDE 10 MG/ML
2.5 INJECTION, SOLUTION INTRAMUSCULAR; INTRAVENOUS; SUBCUTANEOUS EVERY 6 HOURS
Refills: 0 | Status: DISCONTINUED | OUTPATIENT
Start: 2023-02-05 | End: 2023-02-05

## 2023-02-04 RX ORDER — MAGNESIUM HYDROXIDE 400 MG/1
30 TABLET, CHEWABLE ORAL
Refills: 0 | Status: DISCONTINUED | OUTPATIENT
Start: 2023-02-04 | End: 2023-02-05

## 2023-02-04 RX ORDER — ACETAMINOPHEN 500 MG
975 TABLET ORAL
Refills: 0 | Status: DISCONTINUED | OUTPATIENT
Start: 2023-02-04 | End: 2023-02-05

## 2023-02-04 RX ORDER — SODIUM CHLORIDE 9 MG/ML
1000 INJECTION, SOLUTION INTRAVENOUS ONCE
Refills: 0 | Status: COMPLETED | OUTPATIENT
Start: 2023-02-04 | End: 2023-02-04

## 2023-02-04 RX ORDER — SIMETHICONE 80 MG/1
80 TABLET, CHEWABLE ORAL EVERY 4 HOURS
Refills: 0 | Status: DISCONTINUED | OUTPATIENT
Start: 2023-02-04 | End: 2023-02-05

## 2023-02-04 RX ORDER — FAMOTIDINE 10 MG/ML
20 INJECTION INTRAVENOUS ONCE
Refills: 0 | Status: COMPLETED | OUTPATIENT
Start: 2023-02-04 | End: 2023-02-04

## 2023-02-04 RX ORDER — DIPHENHYDRAMINE HCL 50 MG
25 CAPSULE ORAL EVERY 6 HOURS
Refills: 0 | Status: DISCONTINUED | OUTPATIENT
Start: 2023-02-04 | End: 2023-02-05

## 2023-02-04 RX ORDER — OXYCODONE HYDROCHLORIDE 5 MG/1
10 TABLET ORAL
Refills: 0 | Status: DISCONTINUED | OUTPATIENT
Start: 2023-02-05 | End: 2023-02-05

## 2023-02-04 RX ORDER — LANOLIN
1 OINTMENT (GRAM) TOPICAL EVERY 6 HOURS
Refills: 0 | Status: DISCONTINUED | OUTPATIENT
Start: 2023-02-04 | End: 2023-02-05

## 2023-02-04 RX ORDER — CEFAZOLIN SODIUM 1 G
2000 VIAL (EA) INJECTION ONCE
Refills: 0 | Status: COMPLETED | OUTPATIENT
Start: 2023-02-04 | End: 2023-02-04

## 2023-02-04 RX ORDER — OXYCODONE HYDROCHLORIDE 5 MG/1
5 TABLET ORAL
Refills: 0 | Status: DISCONTINUED | OUTPATIENT
Start: 2023-02-05 | End: 2023-02-05

## 2023-02-04 RX ORDER — CITRIC ACID/SODIUM CITRATE 300-500 MG
15 SOLUTION, ORAL ORAL ONCE
Refills: 0 | Status: COMPLETED | OUTPATIENT
Start: 2023-02-04 | End: 2023-02-04

## 2023-02-04 RX ORDER — OXYTOCIN 10 UNIT/ML
333.33 VIAL (ML) INJECTION
Qty: 20 | Refills: 0 | Status: DISCONTINUED | OUTPATIENT
Start: 2023-02-04 | End: 2023-02-04

## 2023-02-04 RX ADMIN — Medication 15 MILLILITER(S): at 19:13

## 2023-02-04 RX ADMIN — SODIUM CHLORIDE 125 MILLILITER(S): 9 INJECTION, SOLUTION INTRAVENOUS at 19:14

## 2023-02-04 RX ADMIN — Medication 200 GRAM(S): at 18:00

## 2023-02-04 RX ADMIN — SODIUM CHLORIDE 2000 MILLILITER(S): 9 INJECTION, SOLUTION INTRAVENOUS at 18:00

## 2023-02-04 RX ADMIN — FAMOTIDINE 20 MILLIGRAM(S): 10 INJECTION INTRAVENOUS at 19:14

## 2023-02-04 NOTE — OB PROVIDER H&P - NSLOWPPHRISK_OBGYN_A_OB
[place] Pregnancy/Less than or equal to 4 previous vaginal births/No known bleeding disorder/No history of postpartum hemorrhage/No other PPH risks indicated

## 2023-02-04 NOTE — OB RN TRIAGE NOTE - FALL HARM RISK - UNIVERSAL INTERVENTIONS
Bed in lowest position, wheels locked, appropriate side rails in place/Call bell, personal items and telephone in reach/Instruct patient to call for assistance before getting out of bed or chair/Non-slip footwear when patient is out of bed/[place] to call system/Physically safe environment - no spills, clutter or unnecessary equipment/Purposeful Proactive Rounding/Room/bathroom lighting operational, light cord in reach

## 2023-02-04 NOTE — OB NEONATOLOGY/PEDIATRICIAN DELIVERY SUMMARY - NSMECDELIVBABYA_OBGYN_ALL_OB
Impression: Dry eye syndrome of bilateral lacrimal glands: H04.123. Plan: Discussed diagnosis with patient. Recommend OTC artificial tears in OU for comfort, 3-4x's a day. no

## 2023-02-04 NOTE — OB RN INTRAOPERATIVE NOTE - NSSELHIDDEN_OBGYN_ALL_OB_FT
[NS_DeliveryAttending1_OBGYN_ALL_OB_FT:[id number]],[NS_DeliveryAssist1_OBGYN_ALL_OB_FT:[id number]] [NS_DeliveryAttending1_OBGYN_ALL_OB_FT:[id number]],[NS_DeliveryAssist1_OBGYN_ALL_OB_FT:[id number]],[NS_DeliveryRN_OBGYN_ALL_OB_FT:[id number]]

## 2023-02-04 NOTE — OB PROVIDER DELIVERY SUMMARY - NSSELHIDDEN_OBGYN_ALL_OB_FT
[NS_DeliveryAttending1_OBGYN_ALL_OB_FT:[id number]],[NS_DeliveryAssist1_OBGYN_ALL_OB_FT:[id number]]

## 2023-02-04 NOTE — OB PROVIDER H&P - NSHPREVIEWOFSYSTEMS_GEN_ALL_CORE
ICU Vital Signs Last 24 Hrs  T(C): 36.5 (04 Feb 2023 18:06), Max: 37.1 (04 Feb 2023 15:42)  T(F): 97.7 (04 Feb 2023 18:06), Max: 98.8 (04 Feb 2023 15:42)  HR: 67 (04 Feb 2023 18:07) (65 - 91)  BP: 122/66 (04 Feb 2023 18:07) (119/75 - 143/81)  BP(mean): --  ABP: --  ABP(mean): --  RR: 18 (04 Feb 2023 18:06) (18 - 20)  SpO2: 98% (04 Feb 2023 17:48) (92% - 100%)    O2 Parameters below as of 04 Feb 2023 18:06  Patient On (Oxygen Delivery Method): room air        gen: A&Ox3  CV: rrr s1s2  abd: gravid soft  VE: closed/long/-3   SSE: nitrazine positive, ferning positive, pooling negative  EFM: 135 moderate variability, + acels, + 1 prolonged deceleration of 3 mins category 2 tracing  toco: Q5mins  bedside sonogram:

## 2023-02-04 NOTE — OB RN PATIENT PROFILE - FUNCTIONAL ASSESSMENT - BASIC MOBILITY 6.
4-calculated by average/Not able to assess (calculate score using [facility] averaging method)

## 2023-02-04 NOTE — OB PROVIDER H&P - HISTORY OF PRESENT ILLNESS
History and Physical    The patient is a 38 y/o  EDC 2023 @ 39. 3 weeks presenting to L&D with leakage of fluids that started slightly on Thursday but increased this morning at 7a. Upon placement of the FHR, the patient was noted to have a 3 min deceleration that resolved with a position change. The patient denies VB, contx. endorses good fetal movement.  The patient accepts all blood products    allergies: nkda  meds;  ASA 81mg, Diclegis daily, PNV, Valtrex 500mg daily  PNC: complicated with uretheral diverticulum vs. abscess vs. periuretheral cyst    Medhx: Uretheral diverticulum vs abscess vs periuretheral cyst that was drained twice during the pregnancy by Dr. Kwan. The patient was treated with Augmentin for staphylococcus and prevotella. The abscess was noted to increase in size in the last 2 weeks with a possible drainage on Monday. The cyst is approx 4x4 cm , obstructing the vaginal outlet.  The patient denies cardiac, pulm, heme, GI, neuro  OBhx: current  Sxhx: pt denies  Gynhx: +HSV1+2.  The patient denies cysts, fibroids, abn . pap  Sochx: pt denies  Famhx: pt denies

## 2023-02-04 NOTE — OB RN DELIVERY SUMMARY - NS_SEPSISRSKCALC_OBGYN_ALL_OB_FT
EOS calculated successfully. EOS Risk Factor: 0.5/1000 live births ([facility] national incidence); GA=39w3d; Temp=98.42; ROM=13.233; GBS='Positive'; Antibiotics='GBS specific antibiotics > 2 hrs prior to birth'

## 2023-02-04 NOTE — OB NEONATOLOGY/PEDIATRICIAN DELIVERY SUMMARY - NSPEDSNEONOTESA_OBGYN_ALL_OB_FT
Peds called to OR as per OB request. 39.3 wk AGA male born via CS on  at  to a  y/o  mother. Prenatal history of Vaginal wall cyst drained x2. Maternal labs include Blood Type O+, HIV - , RPR Non Reactive , Rubella Immune , Hep B - , GBS + treated with amp x1 at 1800, COVID -. SROM at 0700 with clear fluids (ROM hours: ~13). Baby emerged vigorous, crying, was warmed, dried suctioned and stimulated with APGARS of 8/9 . Resuscitation included: Bulb syringe. Mom plans to initiate breastfeeding, consents Hep B vaccine and consents circ.  Highest maternal temp: 36.9. EOS 0.23    Physical Exam:  Gen: awake and active  HEENT: anterior fontanel open soft and flat, nares clinically patent  Resp: no increased work of breathing, good air entry b/l, clear to auscultation bilaterally  Cardio: Normal S1/S2, regular rate and rhythm  Abd: soft, non tender, non distended, + bowel sounds, umbilical cord with 3 vessels  Neuro: +grasp/suck/arnold, normal tone  Extremities: negative stallworth and ortolani, moving all extremities, full range of motion x 4, no crepitus  Skin: pink, warm  Genitals: Normal male anatomy, testicles palpable in scrotum b/l- bilateral hydroceles, Diony 1, anus appears patent

## 2023-02-04 NOTE — OB PROVIDER H&P - ASSESSMENT
36 y/o  @ 39.3 weeks admitted with PROM @ potentially 7a clear fluid. Discussion with Urogyn Dr. Bustillos and Dr. Ruiz for a possible drainage of abscess vs. pLTCS.   -admit to L&D  -routine labs  -NPO bicitra  -EFM/toco  -IV hydration  -ancef  -GBS positive-> ampicillin  -anesthesia consult  -anticipated c/s    d/w Dr. Joseph Rod NP

## 2023-02-04 NOTE — OB PROVIDER DELIVERY SUMMARY - NSPROVIDERDELIVERYNOTE_OBGYN_ALL_OB_FT
pLTCS indicated for periurethral abscess s/p PROM  hysterotomy was closed in a single layer  bilateral tubes and ovaries wnl      IVF 1700      Amyeo Afroz Jereen, PGY-2 pLTCS indicated for periurethral abscess s/p PROM  baby boy apgar 8/9  hysterotomy was closed in a single layer  bilateral tubes and ovaries wnl      IVF 1700      Amyeo Afroz Jereen, PGY-2

## 2023-02-04 NOTE — CHART NOTE - NSCHARTNOTEFT_GEN_A_CORE
Pt is a 37F at 39 weeks in labor w/ hx of recurrent vaginal wall cyst.  Cyst has been drained/aspirated in the office 2x.  Culture on  grew coag negative staph and prevotella bivia (treated with augmentin).  Called by L&D to discuss plan for cyst management now that pt is in labor.  Discussed that urogyn would leave decision to OB for cyst aspiration prior to  but that urogyn would be available for cyst aspiration if needed.  Also discussed that from urogyn perspective, pt did not require  because of presence of recurrent cyst.  Ultimately was informed that OB team decided for elective  in setting of PROM and potential spread of bacteria with cyst aspiration.  Plan discussed with Dr. Gilbert.  Urogyn to be available as needed.    Tammy Ghosh MD  Urogynecology Fellow, PGY-6

## 2023-02-04 NOTE — OB RN PATIENT PROFILE - NS_EDUCATTYPE_OBGYN_ALL_OB
Called mom, results given mom notified understanding.    Reading material offered by provider's office

## 2023-02-04 NOTE — OB PROVIDER H&P - LIVING CHILDREN, OB PROFILE
0 Patient is an 84yo M with a PMHx of Colon Ca (dx 1996 w/short course of chemo and partial colectomy), BPH not on any medical therapy who presented to the ED with wife complaining of constipation for 1 week associated with abd pain.

## 2023-02-04 NOTE — OB RN DELIVERY SUMMARY - NSSELHIDDEN_OBGYN_ALL_OB_FT
[NS_DeliveryAttending1_OBGYN_ALL_OB_FT:[email or web address]],[NS_DeliveryAssist1_OBGYN_ALL_OB_FT:[id number]] [NS_DeliveryAttending1_OBGYN_ALL_OB_FT:[id number]],[NS_DeliveryAssist1_OBGYN_ALL_OB_FT:[id number]],[NS_DeliveryRN_OBGYN_ALL_OB_FT:[id number]]

## 2023-02-05 ENCOUNTER — TRANSCRIPTION ENCOUNTER (OUTPATIENT)
Age: 38
End: 2023-02-05

## 2023-02-05 DIAGNOSIS — K66.1 HEMOPERITONEUM: ICD-10-CM

## 2023-02-05 LAB
ALBUMIN SERPL ELPH-MCNC: 2.4 G/DL — LOW (ref 3.3–5)
ALBUMIN SERPL ELPH-MCNC: 3 G/DL — LOW (ref 3.3–5)
ALP SERPL-CCNC: 199 U/L — HIGH (ref 40–120)
ALP SERPL-CCNC: 249 U/L — HIGH (ref 40–120)
ALT FLD-CCNC: 35 U/L — SIGNIFICANT CHANGE UP (ref 10–45)
ALT FLD-CCNC: 36 U/L — SIGNIFICANT CHANGE UP (ref 10–45)
ANION GAP SERPL CALC-SCNC: 10 MMOL/L — SIGNIFICANT CHANGE UP (ref 5–17)
ANION GAP SERPL CALC-SCNC: 11 MMOL/L — SIGNIFICANT CHANGE UP (ref 5–17)
APTT BLD: 24.2 SEC — LOW (ref 27.5–35.5)
AST SERPL-CCNC: 34 U/L — SIGNIFICANT CHANGE UP (ref 10–40)
AST SERPL-CCNC: 45 U/L — HIGH (ref 10–40)
BASOPHILS # BLD AUTO: 0.02 K/UL — SIGNIFICANT CHANGE UP (ref 0–0.2)
BASOPHILS NFR BLD AUTO: 0.2 % — SIGNIFICANT CHANGE UP (ref 0–2)
BILIRUB SERPL-MCNC: 0.2 MG/DL — SIGNIFICANT CHANGE UP (ref 0.2–1.2)
BILIRUB SERPL-MCNC: 0.6 MG/DL — SIGNIFICANT CHANGE UP (ref 0.2–1.2)
BUN SERPL-MCNC: 7 MG/DL — SIGNIFICANT CHANGE UP (ref 7–23)
BUN SERPL-MCNC: 8 MG/DL — SIGNIFICANT CHANGE UP (ref 7–23)
CALCIUM SERPL-MCNC: 8.2 MG/DL — LOW (ref 8.4–10.5)
CALCIUM SERPL-MCNC: 8.6 MG/DL — SIGNIFICANT CHANGE UP (ref 8.4–10.5)
CHLORIDE SERPL-SCNC: 102 MMOL/L — SIGNIFICANT CHANGE UP (ref 96–108)
CHLORIDE SERPL-SCNC: 106 MMOL/L — SIGNIFICANT CHANGE UP (ref 96–108)
CO2 SERPL-SCNC: 20 MMOL/L — LOW (ref 22–31)
CO2 SERPL-SCNC: 22 MMOL/L — SIGNIFICANT CHANGE UP (ref 22–31)
COVID-19 SPIKE DOMAIN AB INTERP: POSITIVE
COVID-19 SPIKE DOMAIN ANTIBODY RESULT: >250 U/ML — HIGH
CREAT SERPL-MCNC: 0.52 MG/DL — SIGNIFICANT CHANGE UP (ref 0.5–1.3)
CREAT SERPL-MCNC: 0.62 MG/DL — SIGNIFICANT CHANGE UP (ref 0.5–1.3)
EGFR: 118 ML/MIN/1.73M2 — SIGNIFICANT CHANGE UP
EGFR: 123 ML/MIN/1.73M2 — SIGNIFICANT CHANGE UP
EOSINOPHIL # BLD AUTO: 0.05 K/UL — SIGNIFICANT CHANGE UP (ref 0–0.5)
EOSINOPHIL NFR BLD AUTO: 0.4 % — SIGNIFICANT CHANGE UP (ref 0–6)
GAS PNL BLDA: SIGNIFICANT CHANGE UP
GLUCOSE BLDC GLUCOMTR-MCNC: 126 MG/DL — HIGH (ref 70–99)
GLUCOSE SERPL-MCNC: 124 MG/DL — HIGH (ref 70–99)
GLUCOSE SERPL-MCNC: 139 MG/DL — HIGH (ref 70–99)
HCT VFR BLD CALC: 25.9 % — LOW (ref 34.5–45)
HCT VFR BLD CALC: 32.2 % — LOW (ref 34.5–45)
HCT VFR BLD CALC: 32.5 % — LOW (ref 34.5–45)
HEPARINASE TEG R TIME: 3 MIN (ref 4.3–8.3)
HGB BLD-MCNC: 10.7 G/DL — LOW (ref 11.5–15.5)
HGB BLD-MCNC: 10.7 G/DL — LOW (ref 11.5–15.5)
HGB BLD-MCNC: 8.3 G/DL — LOW (ref 11.5–15.5)
IMM GRANULOCYTES NFR BLD AUTO: 1.1 % — HIGH (ref 0–0.9)
INR BLD: 1.01 RATIO — SIGNIFICANT CHANGE UP (ref 0.88–1.16)
INR BLD: 1.04 RATIO — SIGNIFICANT CHANGE UP (ref 0.88–1.16)
LYMPHOCYTES # BLD AUTO: 2.68 K/UL — SIGNIFICANT CHANGE UP (ref 1–3.3)
LYMPHOCYTES # BLD AUTO: 20.4 % — SIGNIFICANT CHANGE UP (ref 13–44)
MAGNESIUM SERPL-MCNC: 1.6 MG/DL — SIGNIFICANT CHANGE UP (ref 1.6–2.6)
MCHC RBC-ENTMCNC: 27.3 PG — SIGNIFICANT CHANGE UP (ref 27–34)
MCHC RBC-ENTMCNC: 27.6 PG — SIGNIFICANT CHANGE UP (ref 27–34)
MCHC RBC-ENTMCNC: 27.7 PG — SIGNIFICANT CHANGE UP (ref 27–34)
MCHC RBC-ENTMCNC: 32 GM/DL — SIGNIFICANT CHANGE UP (ref 32–36)
MCHC RBC-ENTMCNC: 32.9 GM/DL — SIGNIFICANT CHANGE UP (ref 32–36)
MCHC RBC-ENTMCNC: 33.2 GM/DL — SIGNIFICANT CHANGE UP (ref 32–36)
MCV RBC AUTO: 83 FL — SIGNIFICANT CHANGE UP (ref 80–100)
MCV RBC AUTO: 84.2 FL — SIGNIFICANT CHANGE UP (ref 80–100)
MCV RBC AUTO: 85.2 FL — SIGNIFICANT CHANGE UP (ref 80–100)
MONOCYTES # BLD AUTO: 0.94 K/UL — HIGH (ref 0–0.9)
MONOCYTES NFR BLD AUTO: 7.2 % — SIGNIFICANT CHANGE UP (ref 2–14)
NEUTROPHILS # BLD AUTO: 9.3 K/UL — HIGH (ref 1.8–7.4)
NEUTROPHILS NFR BLD AUTO: 70.7 % — SIGNIFICANT CHANGE UP (ref 43–77)
NRBC # BLD: 0 /100 WBCS — SIGNIFICANT CHANGE UP (ref 0–0)
PHOSPHATE SERPL-MCNC: 4.2 MG/DL — SIGNIFICANT CHANGE UP (ref 2.5–4.5)
PLATELET # BLD AUTO: 155 K/UL — SIGNIFICANT CHANGE UP (ref 150–400)
PLATELET # BLD AUTO: 213 K/UL — SIGNIFICANT CHANGE UP (ref 150–400)
PLATELET # BLD AUTO: 218 K/UL — SIGNIFICANT CHANGE UP (ref 150–400)
POTASSIUM SERPL-MCNC: 3.6 MMOL/L — SIGNIFICANT CHANGE UP (ref 3.5–5.3)
POTASSIUM SERPL-MCNC: 4.8 MMOL/L — SIGNIFICANT CHANGE UP (ref 3.5–5.3)
POTASSIUM SERPL-SCNC: 3.6 MMOL/L — SIGNIFICANT CHANGE UP (ref 3.5–5.3)
POTASSIUM SERPL-SCNC: 4.8 MMOL/L — SIGNIFICANT CHANGE UP (ref 3.5–5.3)
PROT SERPL-MCNC: 4.7 G/DL — LOW (ref 6–8.3)
PROT SERPL-MCNC: 5.4 G/DL — LOW (ref 6–8.3)
PROTHROM AB SERPL-ACNC: 11.7 SEC — SIGNIFICANT CHANGE UP (ref 10.5–13.4)
PROTHROM AB SERPL-ACNC: 12.1 SEC — SIGNIFICANT CHANGE UP (ref 10.5–13.4)
RAPIDTEG MAXIMUM AMPLITUDE: 64.4 MM — SIGNIFICANT CHANGE UP (ref 52–70)
RBC # BLD: 3.04 M/UL — LOW (ref 3.8–5.2)
RBC # BLD: 3.86 M/UL — SIGNIFICANT CHANGE UP (ref 3.8–5.2)
RBC # BLD: 3.88 M/UL — SIGNIFICANT CHANGE UP (ref 3.8–5.2)
RBC # FLD: 14.8 % — HIGH (ref 10.3–14.5)
RBC # FLD: 14.8 % — HIGH (ref 10.3–14.5)
RBC # FLD: 15 % — HIGH (ref 10.3–14.5)
SARS-COV-2 IGG+IGM SERPL QL IA: >250 U/ML — HIGH
SARS-COV-2 IGG+IGM SERPL QL IA: POSITIVE
SODIUM SERPL-SCNC: 135 MMOL/L — SIGNIFICANT CHANGE UP (ref 135–145)
SODIUM SERPL-SCNC: 136 MMOL/L — SIGNIFICANT CHANGE UP (ref 135–145)
T PALLIDUM AB TITR SER: NEGATIVE — SIGNIFICANT CHANGE UP
TEG FUNCTIONAL FIBRINOGEN: 23.5 MM — SIGNIFICANT CHANGE UP (ref 15–32)
TEG MAXIMUM AMPLITUDE: 65.3 MM — SIGNIFICANT CHANGE UP (ref 52–69)
TEG REACTION TIME: 3.1 MIN (ref 4.6–9.1)
WBC # BLD: 13.14 K/UL — HIGH (ref 3.8–10.5)
WBC # BLD: 14.07 K/UL — HIGH (ref 3.8–10.5)
WBC # BLD: 15.56 K/UL — HIGH (ref 3.8–10.5)
WBC # FLD AUTO: 13.14 K/UL — HIGH (ref 3.8–10.5)
WBC # FLD AUTO: 14.07 K/UL — HIGH (ref 3.8–10.5)
WBC # FLD AUTO: 15.56 K/UL — HIGH (ref 3.8–10.5)

## 2023-02-05 PROCEDURE — 49000 EXPLORATION OF ABDOMEN: CPT | Mod: 78

## 2023-02-05 DEVICE — KIT A-LINE 1LUM 20G X 12CM SAFE KIT: Type: IMPLANTABLE DEVICE | Status: FUNCTIONAL

## 2023-02-05 RX ORDER — SODIUM CHLORIDE 9 MG/ML
1000 INJECTION, SOLUTION INTRAVENOUS
Refills: 0 | Status: DISCONTINUED | OUTPATIENT
Start: 2023-02-05 | End: 2023-02-06

## 2023-02-05 RX ORDER — ACETAMINOPHEN 500 MG
750 TABLET ORAL EVERY 6 HOURS
Refills: 0 | Status: DISCONTINUED | OUTPATIENT
Start: 2023-02-05 | End: 2023-02-06

## 2023-02-05 RX ORDER — MORPHINE SULFATE 50 MG/1
4 CAPSULE, EXTENDED RELEASE ORAL ONCE
Refills: 0 | Status: DISCONTINUED | OUTPATIENT
Start: 2023-02-05 | End: 2023-02-05

## 2023-02-05 RX ORDER — CHLORHEXIDINE GLUCONATE 213 G/1000ML
1 SOLUTION TOPICAL
Refills: 0 | Status: DISCONTINUED | OUTPATIENT
Start: 2023-02-05 | End: 2023-02-11

## 2023-02-05 RX ORDER — MORPHINE SULFATE 50 MG/1
2 CAPSULE, EXTENDED RELEASE ORAL ONCE
Refills: 0 | Status: DISCONTINUED | OUTPATIENT
Start: 2023-02-05 | End: 2023-02-05

## 2023-02-05 RX ADMIN — Medication 750 MILLIGRAM(S): at 23:45

## 2023-02-05 RX ADMIN — NALBUPHINE HYDROCHLORIDE 2.5 MILLIGRAM(S): 10 INJECTION, SOLUTION INTRAMUSCULAR; INTRAVENOUS; SUBCUTANEOUS at 10:30

## 2023-02-05 RX ADMIN — MORPHINE SULFATE 2 MILLIGRAM(S): 50 CAPSULE, EXTENDED RELEASE ORAL at 17:42

## 2023-02-05 RX ADMIN — OXYCODONE HYDROCHLORIDE 5 MILLIGRAM(S): 5 TABLET ORAL at 13:34

## 2023-02-05 RX ADMIN — Medication 975 MILLIGRAM(S): at 14:30

## 2023-02-05 RX ADMIN — HEPARIN SODIUM 5000 UNIT(S): 5000 INJECTION INTRAVENOUS; SUBCUTANEOUS at 16:47

## 2023-02-05 RX ADMIN — NALBUPHINE HYDROCHLORIDE 2.5 MILLIGRAM(S): 10 INJECTION, SOLUTION INTRAMUSCULAR; INTRAVENOUS; SUBCUTANEOUS at 09:43

## 2023-02-05 RX ADMIN — Medication 30 MILLIGRAM(S): at 05:27

## 2023-02-05 RX ADMIN — OXYCODONE HYDROCHLORIDE 5 MILLIGRAM(S): 5 TABLET ORAL at 14:07

## 2023-02-05 RX ADMIN — Medication 30 MILLIGRAM(S): at 16:46

## 2023-02-05 RX ADMIN — Medication 30 MILLIGRAM(S): at 12:00

## 2023-02-05 RX ADMIN — HEPARIN SODIUM 5000 UNIT(S): 5000 INJECTION INTRAVENOUS; SUBCUTANEOUS at 05:27

## 2023-02-05 RX ADMIN — Medication 975 MILLIGRAM(S): at 09:24

## 2023-02-05 RX ADMIN — Medication 975 MILLIGRAM(S): at 15:00

## 2023-02-05 RX ADMIN — Medication 30 MILLIGRAM(S): at 11:14

## 2023-02-05 RX ADMIN — SODIUM CHLORIDE 100 MILLILITER(S): 9 INJECTION, SOLUTION INTRAVENOUS at 22:41

## 2023-02-05 RX ADMIN — Medication 975 MILLIGRAM(S): at 10:00

## 2023-02-05 RX ADMIN — MORPHINE SULFATE 2 MILLIGRAM(S): 50 CAPSULE, EXTENDED RELEASE ORAL at 17:41

## 2023-02-05 RX ADMIN — Medication 30 MILLIGRAM(S): at 06:18

## 2023-02-05 RX ADMIN — Medication 300 MILLIGRAM(S): at 23:15

## 2023-02-05 NOTE — PROGRESS NOTE ADULT - SUBJECTIVE AND OBJECTIVE BOX
Day 1 of Anesthesia Pain Management Service    SUBJECTIVE:  Pain Scale Score:          [X] Refer to charted pain scores    THERAPY:    s/p 0.1 mg PF morphine on 2/4      MEDICATIONS  (STANDING):  acetaminophen     Tablet .. 975 milliGRAM(s) Oral <User Schedule>  diphtheria/tetanus/pertussis (acellular) Vaccine (Adacel) 0.5 milliLiter(s) IntraMuscular once  heparin   Injectable 5000 Unit(s) SubCutaneous every 12 hours  ibuprofen  Tablet. 600 milliGRAM(s) Oral every 6 hours  ketorolac   Injectable 30 milliGRAM(s) IV Push every 6 hours  morphine PF Spinal 0.1 milliGRAM(s) IntraThecal. once  oxytocin Infusion 333.333 milliUNIT(s)/Min (1000 mL/Hr) IV Continuous <Continuous>  oxytocin Infusion 333.333 milliUNIT(s)/Min (1000 mL/Hr) IV Continuous <Continuous>    MEDICATIONS  (PRN):  dexAMETHasone  Injectable 4 milliGRAM(s) IV Push every 6 hours PRN Nausea  diphenhydrAMINE 25 milliGRAM(s) Oral every 6 hours PRN Pruritus  diphenhydrAMINE Injectable 25 milliGRAM(s) IV Push every 4 hours PRN Pruritus  lanolin Ointment 1 Application(s) Topical every 6 hours PRN Sore Nipples  magnesium hydroxide Suspension 30 milliLiter(s) Oral two times a day PRN Constipation  nalbuphine Injectable 2.5 milliGRAM(s) IV Push every 6 hours PRN Pruritus  naloxone Injectable 0.1 milliGRAM(s) IV Push every 3 minutes PRN For ANY of the following changes in patient status:  A. Breaths Per Minute LESS THAN 10, B. Oxygen saturation LESS THAN 90%, C. Sedation score of 6 for Stop After: 4 Times  ondansetron Injectable 4 milliGRAM(s) IV Push every 6 hours PRN Nausea  oxyCODONE    IR 5 milliGRAM(s) Oral every 3 hours PRN Moderate to Severe Pain (4-10)  oxyCODONE    IR 5 milliGRAM(s) Oral once PRN Moderate to Severe Pain (4-10)  oxyCODONE    IR 5 milliGRAM(s) Oral every 3 hours PRN Mild Pain (1 - 3)  oxyCODONE    IR 10 milliGRAM(s) Oral every 3 hours PRN Moderate Pain (4 - 6)  simethicone 80 milliGRAM(s) Chew every 4 hours PRN Gas      OBJECTIVE:    Sedation:        	[X] Alert	[ ] Drowsy	[ ] Arousable      [ ] Asleep       [ ] Unresponsive    Side Effects:	[ ] None	[ ] Nausea	[ ] Vomiting         [x ] Pruritus  		[ ] Weakness            [ ] Numbness	          [ ] Other:    Vital Signs Last 24 Hrs  T(C): 36.9 (05 Feb 2023 06:25), Max: 37.1 (04 Feb 2023 15:42)  T(F): 98.4 (05 Feb 2023 06:25), Max: 98.8 (04 Feb 2023 15:42)  HR: 85 (05 Feb 2023 01:05) (62 - 91)  BP: 122/65 (05 Feb 2023 01:05) (107/59 - 149/71)  BP(mean): 81 (04 Feb 2023 23:07) (76 - 102)  RR: 18 (05 Feb 2023 06:25) (16 - 20)  SpO2: 99% (05 Feb 2023 06:25) (92% - 100%)    Parameters below as of 05 Feb 2023 06:25  Patient On (Oxygen Delivery Method): room air        ASSESSMENT/ PLAN  [X] Patient transitioned to prn analgesics  [X] Pain management per primary service, pain service to sign off   [X]Documentation and Verification of current medications

## 2023-02-05 NOTE — CONSULT NOTE ADULT - SUBJECTIVE AND OBJECTIVE BOX
HISTORY:      37F CS delivery about 24 hrs ago. Pt presented s/p csection after going to bathroom with episode of severe abdominal pain and hypotension in mostly in upper quadrants (10/10)  US showed complex fluid around liver. PT was ex lap and found to have approx 1L of clot / fluid total without any signs of active bleed Intraop got 3u prbc. . Pt here in sicu for hemorrhagic watch.      NEURO  RASS (if intubated): 		CAM ICU (if concern for delirium):  Exam: axo3   Meds:     RESPIRATORY  RR: 18 (02-05-23 @ 19:18) (16 - 18)  SpO2: 100% (02-05-23 @ 22:12) (98% - 100%)  Wt(kg): --  Exam:  Mechanical Ventilation:   ABG - ( 05 Feb 2023 20:40 )  pH: 7.41  /  pCO2: 34    /  pO2: 270   / HCO3: 22    / Base Excess: -2.6  /  SaO2: 99.9    Lactate: x                Meds:     CARDIOVASCULAR  HR: 69 (02-05-23 @ 22:12) (60 - 93)  BP: 130/77 (02-05-23 @ 19:18) (107/65 - 149/71)  BP(mean): 81 (02-05-23 @ 19:18) (81 - 102)  ABP: 151/90 (02-05-23 @ 22:12) (151/90 - 151/90)  ABP(mean): 116 (02-05-23 @ 22:12) (116 - 116)  Wt(kg): --  CVP(cm H2O): --      Exam:   Cardiac Rhythm:   Perfusion     [ x]Adequate   [ ]Inadequate  Mentation  x [ ]Normal       [ ]Reduced  Extremities  [ x]Warm         [ ]Cool  Volume Status [ ]Hypervolemic [x ]Euvolemic [ ]Hypovolemic  Meds:     GI/NUTRITION  Exam:  Non tender soft   Diet:   Meds:     GENITOURINARY  I&O's Detail    02-04 @ 07:01  -  02-05 @ 07:00  --------------------------------------------------------  IN:    IV PiggyBack: 100 mL  Total IN: 100 mL    OUT:    Indwelling Catheter - Urethral (mL): 300 mL  Total OUT: 300 mL    Total NET: -200 mL      02-05 @ 07:01  -  02-05 @ 22:15  --------------------------------------------------------  IN:  Total IN: 0 mL    OUT:    Voided (mL): 800 mL  Total OUT: 800 mL    Total NET: -800 mL        Weight (kg): 59.4 (02-05 @ 19:18)  02-05    135  |  102  |  8   ----------------------------<  139<H>  3.6   |  22  |  0.62    Ca    8.6      05 Feb 2023 17:27    TPro  5.4<L>  /  Alb  3.0<L>  /  TBili  0.2  /  DBili  x   /  AST  34  /  ALT  35  /  AlkPhos  249<H>  02-05    Meds: oxytocin Infusion 333.333 milliUNIT(s)/Min IV Continuous <Continuous>  oxytocin Infusion 333.333 milliUNIT(s)/Min IV Continuous <Continuous>      HEMATOLOGIC  Meds:                         8.3    13.14 )-----------( 213      ( 05 Feb 2023 17:27 )             25.9     PT/INR - ( 05 Feb 2023 17:27 )   PT: 11.7 sec;   INR: 1.01 ratio             INFECTIOUS DISEASES  T(C): 36.3 (02-05-23 @ 22:12), Max: 36.9 (02-05-23 @ 06:25)  Wt(kg): --  WBC Count: 13.14 K/uL (02-05 @ 17:27)  WBC Count: 15.56 K/uL (02-05 @ 07:00)    Recent Cultures:    Meds:     ENDOCRINE  Capillary Blood Glucose    Meds:     ACCESS DEVICES:  [ x] Peripheral IV  [ ] Central Venous Line		[ ] R	[ ] L	[ ] IJ	[ ] Fem	[ ] SC	Placed:   [x ] Arterial Line			[ ] R	[x ] L	[ ] Fem	[x ] Rad	[ ] Ax	Placed:   [ ] PICC:					[ ] Mediport  [ ] Urinary Catheter, Date Placed:   [ ] Necessity of urinary, arterial, and venous catheters discussed    OTHER MEDICATIONS:      IMAGING:

## 2023-02-05 NOTE — PROGRESS NOTE ADULT - PROBLEM SELECTOR PLAN 1
Increase OOB  DVT ppx  Due to void  Regular diet  UroGyn consulted  Routine Postpartum/Post-op care    Kimberley Broderick FNP-BC

## 2023-02-05 NOTE — PROGRESS NOTE ADULT - SUBJECTIVE AND OBJECTIVE BOX
Postpartum Note-  Section POD#1    Allergies :No Known Allergies    Blood Type  O  Positive  Rubella Immune  RPR Negative    S: Patient is a 37y   POD#1 S/P  primary C/Sec for periurethral abscess   Patient w/o complaints, pain is controlled.  Pt is OOB, tolerating PO, passing flatus. Lochia WNL. Due to void    Feeding: Breast    O:  Vital Signs Last 24 Hrs  T(C): 36.9 (2023 06:25), Max: 37.1 (2023 15:42)  T(F): 98.4 (2023 06:25), Max: 98.8 (2023 15:42)  HR: 85 (2023 01:05) (62 - 91)  BP: 122/65 (2023 01:05) (107/59 - 149/71)  BP(mean): 81 (2023 23:07) (76 - 102)  RR: 18 (2023 06:25) (16 - 20)  SpO2: 99% (2023 06:25) (92% - 100%)    Gen: NAD  Abdomen: Soft, nontender, non-distended, fundus firm.  Incision: Clean, dry, and intact.  Negative erythema/edema/ecchymosis   Sub Q  Lochia WNL  Ext: Negative edema, negative tenderness    LABS:                          10.7   15.56 )-----------( 218      ( 2023 07:00 )             32.5

## 2023-02-05 NOTE — CHART NOTE - NSCHARTNOTEFT_GEN_A_CORE
R4 Event Note    Called by RN to evaluate patient after she was attempting to have a bowel movement when she suddenly felt lightheaded and dizzy. She had severe nausea that then resulted in severe abdominal pain. Quickly laid down on bed but then became pale, diaphoretic and tachycardic. Pain 10/10. I was present bedside with  PGY1 and  . STAT CBC/Coags/CMP was sent. Abdominal exam performed and patient found to distended with rebound and guarding. Initial BP 90/54 with tachycardia to 110s. BSUS performed with +FAST with fluid around liver edge and spleen. No fluid or distention within endometrial cavity. No vaginal bleeding.    Rapid response called at that time. CBC then returned with decrease in H/H once again (see labs below). Decision made to take patient to OR for ExLap, removal of hemoperitoneum, repair of damaged organs and possible hysterectomy. Patient and  in agreement with plan.  present by the time patient was at OR .               8.3    13.14 )-----------( 213      ( 02-05 @ 17:27 )             25.9                10.7   15.56 )-----------( 218      ( 02-05 @ 07:00 )             32.5         Eze Martino PGY4 R4 Event Note    Called by RN to evaluate patient after she was attempting to have a bowel movement when she suddenly felt lightheaded and dizzy. She had severe nausea that then resulted in severe abdominal pain. Quickly laid down on bed but then became pale, diaphoretic and tachycardic. Pain 10/10. I was present bedside with  PGY1 and  . STAT CBC/Coags/CMP was sent. Abdominal exam performed and patient found to distended with rebound and guarding. Initial BP 90/54 with tachycardia to 110s. BSUS performed with +FAST with fluid around liver edge and spleen. No fluid or distention within endometrial cavity. No vaginal bleeding.    Rapid response called at that time. CBC then returned with decrease in H/H once again (see labs below). Decision made to take patient to OR for ExLap, removal of hemoperitoneum, repair of damaged organs and possible hysterectomy. Patient and  in agreement with plan.  present by the time patient was at OR .               8.3    13.14 )-----------( 213      ( 02-05 @ 17:27 )             25.9                10.7   15.56 )-----------( 218      ( 02-05 @ 07:00 )             32.5         Eze Martino PGY4    Attending  called by Service Attending during evaluation of pt  agreed with decision to proceed to OR and met pt at OR desk

## 2023-02-05 NOTE — PATIENT PROFILE ADULT - FALL HARM RISK - HARM RISK INTERVENTIONS

## 2023-02-05 NOTE — CONSULT NOTE ADULT - ASSESSMENT
##Neuro##  Non sedated   Pain   -Tyl PRN     ##Resp##  JULISSA   100% RA     ##Cardiac##  Hypotension / Hemorrhagic shock       RPT 1 hour cbc q4 after   Monitor BP     ##GI###  S/p C section and ex lap   Monitor abd serial abd exams,   Monitor LFT     :   LR @100 /hr     Endo  No active issues     Lines 2x peripheral and A line

## 2023-02-05 NOTE — CHART NOTE - NSCHARTNOTEFT_GEN_A_CORE
[facility] OB  NOTE   2/5/23  5:45pm  I was called urgently to see this pt on postpartum (4-Juan) floor.   Briefly, she had a primary CS delivery about 24 hrs ago.  She was doing well earlier this am.  She got up to use the bathroom.  After Valsalva, she felt abd pain and dizziness and was aided back to her bed.  Her pain progressively became very severe.  Upon my arrival:  Pt was writhing in pain and c/o very severe abd pain, mostly in upper quadrants (10/10)  VS's  BP 90's/50's    pulse rate 90s  Gen: + pallor of face, lips, sclerae  exam: abd: very distended and firm.  + sig rebound tenderness in all 4 quadrants.  + guarding  Perineum: normal PP lochia.  no abnormal bleeding  "Fast-Scan" done:  complex fluid (likely blood) noted around liver. margin of fluid around liver about 3cm    A/P:    Most likely intra-abdominal hemorrhage, resulting in abd distention, significant symptoms and change in VS's  started 2 IV lines.  fluid resuscitation ordered  Morphine 4 mg IV push ordered  stat labs ordered:  prelim result:   hgb 8.3 (down from 10.7 g earlier this am)  Pt is not stable enough to go down to radiology dept for CT study  advised pt should be taken to OR immediately for surgical exploration of her abd.  Dr Ruiz, primary ob/gyn, was notified.  Dr. Ruiz arrived at the hospital and evaluated the pt in the holding area of Main OR before she was taken to the OR.

## 2023-02-05 NOTE — PATIENT PROFILE ADULT - IS PATIENT PREGNANT?
Called Pt and LM to call us back  
Please inform patient that labs received from [facility] (collected on March 2, 2017) showed normal thyroid hormone levels. TSH is 0.44 and free T4 1.20. No changes to the dose of thyroid hormone replacement.  
no

## 2023-02-05 NOTE — PROGRESS NOTE ADULT - ASSESSMENT
A/P: 37y    S/P primary   section POD#1 for periurethral abscess, doing well    Current Issues: Periurethral abscess stable, patient without discomfort at this time

## 2023-02-06 LAB
ALBUMIN SERPL ELPH-MCNC: 2.4 G/DL — LOW (ref 3.3–5)
ALP SERPL-CCNC: 181 U/L — HIGH (ref 40–120)
ALT FLD-CCNC: 33 U/L — SIGNIFICANT CHANGE UP (ref 10–45)
ANION GAP SERPL CALC-SCNC: 10 MMOL/L — SIGNIFICANT CHANGE UP (ref 5–17)
APPEARANCE UR: CLEAR — SIGNIFICANT CHANGE UP
APTT BLD: 24.1 SEC — LOW (ref 27.5–35.5)
AST SERPL-CCNC: 43 U/L — HIGH (ref 10–40)
BILIRUB SERPL-MCNC: 0.3 MG/DL — SIGNIFICANT CHANGE UP (ref 0.2–1.2)
BILIRUB UR-MCNC: NEGATIVE — SIGNIFICANT CHANGE UP
BUN SERPL-MCNC: 6 MG/DL — LOW (ref 7–23)
CALCIUM SERPL-MCNC: 8.2 MG/DL — LOW (ref 8.4–10.5)
CHLORIDE SERPL-SCNC: 106 MMOL/L — SIGNIFICANT CHANGE UP (ref 96–108)
CO2 SERPL-SCNC: 21 MMOL/L — LOW (ref 22–31)
COLOR SPEC: COLORLESS — SIGNIFICANT CHANGE UP
CREAT SERPL-MCNC: 0.48 MG/DL — LOW (ref 0.5–1.3)
DIFF PNL FLD: NEGATIVE — SIGNIFICANT CHANGE UP
EGFR: 125 ML/MIN/1.73M2 — SIGNIFICANT CHANGE UP
GAS PNL BLDA: SIGNIFICANT CHANGE UP
GLUCOSE SERPL-MCNC: 123 MG/DL — HIGH (ref 70–99)
GLUCOSE UR QL: NEGATIVE — SIGNIFICANT CHANGE UP
HCT VFR BLD CALC: 29 % — LOW (ref 34.5–45)
HCT VFR BLD CALC: 29.5 % — LOW (ref 34.5–45)
HCT VFR BLD CALC: 30.6 % — LOW (ref 34.5–45)
HGB BLD-MCNC: 10 G/DL — LOW (ref 11.5–15.5)
HGB BLD-MCNC: 10.6 G/DL — LOW (ref 11.5–15.5)
HGB BLD-MCNC: 9.9 G/DL — LOW (ref 11.5–15.5)
INR BLD: 1.08 RATIO — SIGNIFICANT CHANGE UP (ref 0.88–1.16)
KETONES UR-MCNC: NEGATIVE — SIGNIFICANT CHANGE UP
LEUKOCYTE ESTERASE UR-ACNC: NEGATIVE — SIGNIFICANT CHANGE UP
MAGNESIUM SERPL-MCNC: 1.5 MG/DL — LOW (ref 1.6–2.6)
MCHC RBC-ENTMCNC: 27.7 PG — SIGNIFICANT CHANGE UP (ref 27–34)
MCHC RBC-ENTMCNC: 28.3 PG — SIGNIFICANT CHANGE UP (ref 27–34)
MCHC RBC-ENTMCNC: 28.3 PG — SIGNIFICANT CHANGE UP (ref 27–34)
MCHC RBC-ENTMCNC: 33.6 GM/DL — SIGNIFICANT CHANGE UP (ref 32–36)
MCHC RBC-ENTMCNC: 34.5 GM/DL — SIGNIFICANT CHANGE UP (ref 32–36)
MCHC RBC-ENTMCNC: 34.6 GM/DL — SIGNIFICANT CHANGE UP (ref 32–36)
MCV RBC AUTO: 81.8 FL — SIGNIFICANT CHANGE UP (ref 80–100)
MCV RBC AUTO: 82.2 FL — SIGNIFICANT CHANGE UP (ref 80–100)
MCV RBC AUTO: 82.4 FL — SIGNIFICANT CHANGE UP (ref 80–100)
NITRITE UR-MCNC: NEGATIVE — SIGNIFICANT CHANGE UP
NRBC # BLD: 0 /100 WBCS — SIGNIFICANT CHANGE UP (ref 0–0)
PH UR: 6.5 — SIGNIFICANT CHANGE UP (ref 5–8)
PHOSPHATE SERPL-MCNC: 5 MG/DL — HIGH (ref 2.5–4.5)
PLATELET # BLD AUTO: 155 K/UL — SIGNIFICANT CHANGE UP (ref 150–400)
PLATELET # BLD AUTO: 157 K/UL — SIGNIFICANT CHANGE UP (ref 150–400)
PLATELET # BLD AUTO: 165 K/UL — SIGNIFICANT CHANGE UP (ref 150–400)
POTASSIUM SERPL-MCNC: 4.1 MMOL/L — SIGNIFICANT CHANGE UP (ref 3.5–5.3)
POTASSIUM SERPL-SCNC: 4.1 MMOL/L — SIGNIFICANT CHANGE UP (ref 3.5–5.3)
PROT SERPL-MCNC: 4.5 G/DL — LOW (ref 6–8.3)
PROT UR-MCNC: NEGATIVE — SIGNIFICANT CHANGE UP
PROTHROM AB SERPL-ACNC: 12.5 SEC — SIGNIFICANT CHANGE UP (ref 10.5–13.4)
RBC # BLD: 3.53 M/UL — LOW (ref 3.8–5.2)
RBC # BLD: 3.58 M/UL — LOW (ref 3.8–5.2)
RBC # BLD: 3.74 M/UL — LOW (ref 3.8–5.2)
RBC # FLD: 14.6 % — HIGH (ref 10.3–14.5)
RBC # FLD: 15 % — HIGH (ref 10.3–14.5)
RBC # FLD: 15 % — HIGH (ref 10.3–14.5)
SODIUM SERPL-SCNC: 137 MMOL/L — SIGNIFICANT CHANGE UP (ref 135–145)
SP GR SPEC: 1.01 — LOW (ref 1.01–1.02)
UROBILINOGEN FLD QL: NEGATIVE — SIGNIFICANT CHANGE UP
WBC # BLD: 12.63 K/UL — HIGH (ref 3.8–10.5)
WBC # BLD: 13.12 K/UL — HIGH (ref 3.8–10.5)
WBC # BLD: 13.44 K/UL — HIGH (ref 3.8–10.5)
WBC # FLD AUTO: 12.63 K/UL — HIGH (ref 3.8–10.5)
WBC # FLD AUTO: 13.12 K/UL — HIGH (ref 3.8–10.5)
WBC # FLD AUTO: 13.44 K/UL — HIGH (ref 3.8–10.5)

## 2023-02-06 PROCEDURE — 99291 CRITICAL CARE FIRST HOUR: CPT

## 2023-02-06 RX ORDER — MAGNESIUM SULFATE 500 MG/ML
4 VIAL (ML) INJECTION ONCE
Refills: 0 | Status: COMPLETED | OUTPATIENT
Start: 2023-02-06 | End: 2023-02-06

## 2023-02-06 RX ORDER — BENZOCAINE AND MENTHOL 5; 1 G/100ML; G/100ML
1 LIQUID ORAL
Refills: 0 | Status: COMPLETED | OUTPATIENT
Start: 2023-02-06 | End: 2023-02-08

## 2023-02-06 RX ORDER — ONDANSETRON 8 MG/1
4 TABLET, FILM COATED ORAL ONCE
Refills: 0 | Status: COMPLETED | OUTPATIENT
Start: 2023-02-06 | End: 2023-02-06

## 2023-02-06 RX ORDER — SIMETHICONE 80 MG/1
80 TABLET, CHEWABLE ORAL DAILY
Refills: 0 | Status: DISCONTINUED | OUTPATIENT
Start: 2023-02-06 | End: 2023-02-11

## 2023-02-06 RX ORDER — HYDROMORPHONE HYDROCHLORIDE 2 MG/ML
0.5 INJECTION INTRAMUSCULAR; INTRAVENOUS; SUBCUTANEOUS ONCE
Refills: 0 | Status: DISCONTINUED | OUTPATIENT
Start: 2023-02-06 | End: 2023-02-06

## 2023-02-06 RX ORDER — POLYETHYLENE GLYCOL 3350 17 G/17G
17 POWDER, FOR SOLUTION ORAL DAILY
Refills: 0 | Status: DISCONTINUED | OUTPATIENT
Start: 2023-02-06 | End: 2023-02-11

## 2023-02-06 RX ORDER — SODIUM CHLORIDE 9 MG/ML
1000 INJECTION, SOLUTION INTRAVENOUS ONCE
Refills: 0 | Status: COMPLETED | OUTPATIENT
Start: 2023-02-06 | End: 2023-02-06

## 2023-02-06 RX ORDER — ENOXAPARIN SODIUM 100 MG/ML
40 INJECTION SUBCUTANEOUS EVERY 24 HOURS
Refills: 0 | Status: DISCONTINUED | OUTPATIENT
Start: 2023-02-06 | End: 2023-02-11

## 2023-02-06 RX ORDER — IBUPROFEN 200 MG
600 TABLET ORAL EVERY 6 HOURS
Refills: 0 | Status: DISCONTINUED | OUTPATIENT
Start: 2023-02-06 | End: 2023-02-07

## 2023-02-06 RX ORDER — SENNA PLUS 8.6 MG/1
2 TABLET ORAL AT BEDTIME
Refills: 0 | Status: DISCONTINUED | OUTPATIENT
Start: 2023-02-06 | End: 2023-02-11

## 2023-02-06 RX ORDER — ACETAMINOPHEN 500 MG
975 TABLET ORAL EVERY 6 HOURS
Refills: 0 | Status: DISCONTINUED | OUTPATIENT
Start: 2023-02-06 | End: 2023-02-07

## 2023-02-06 RX ORDER — OXYCODONE HYDROCHLORIDE 5 MG/1
10 TABLET ORAL ONCE
Refills: 0 | Status: DISCONTINUED | OUTPATIENT
Start: 2023-02-06 | End: 2023-02-06

## 2023-02-06 RX ORDER — OXYCODONE HYDROCHLORIDE 5 MG/1
5 TABLET ORAL EVERY 4 HOURS
Refills: 0 | Status: DISCONTINUED | OUTPATIENT
Start: 2023-02-06 | End: 2023-02-07

## 2023-02-06 RX ORDER — LANOLIN ALCOHOL/MO/W.PET/CERES
3 CREAM (GRAM) TOPICAL AT BEDTIME
Refills: 0 | Status: DISCONTINUED | OUTPATIENT
Start: 2023-02-06 | End: 2023-02-11

## 2023-02-06 RX ADMIN — Medication 600 MILLIGRAM(S): at 22:15

## 2023-02-06 RX ADMIN — Medication 600 MILLIGRAM(S): at 16:04

## 2023-02-06 RX ADMIN — OXYCODONE HYDROCHLORIDE 5 MILLIGRAM(S): 5 TABLET ORAL at 02:31

## 2023-02-06 RX ADMIN — CHLORHEXIDINE GLUCONATE 1 APPLICATION(S): 213 SOLUTION TOPICAL at 05:03

## 2023-02-06 RX ADMIN — OXYCODONE HYDROCHLORIDE 5 MILLIGRAM(S): 5 TABLET ORAL at 12:00

## 2023-02-06 RX ADMIN — Medication 600 MILLIGRAM(S): at 17:22

## 2023-02-06 RX ADMIN — OXYCODONE HYDROCHLORIDE 5 MILLIGRAM(S): 5 TABLET ORAL at 07:44

## 2023-02-06 RX ADMIN — BENZOCAINE AND MENTHOL 1 LOZENGE: 5; 1 LIQUID ORAL at 17:18

## 2023-02-06 RX ADMIN — OXYCODONE HYDROCHLORIDE 5 MILLIGRAM(S): 5 TABLET ORAL at 20:30

## 2023-02-06 RX ADMIN — OXYCODONE HYDROCHLORIDE 5 MILLIGRAM(S): 5 TABLET ORAL at 19:40

## 2023-02-06 RX ADMIN — Medication 300 MILLIGRAM(S): at 05:03

## 2023-02-06 RX ADMIN — OXYCODONE HYDROCHLORIDE 5 MILLIGRAM(S): 5 TABLET ORAL at 15:07

## 2023-02-06 RX ADMIN — BENZOCAINE AND MENTHOL 1 LOZENGE: 5; 1 LIQUID ORAL at 05:03

## 2023-02-06 RX ADMIN — OXYCODONE HYDROCHLORIDE 5 MILLIGRAM(S): 5 TABLET ORAL at 03:16

## 2023-02-06 RX ADMIN — Medication 975 MILLIGRAM(S): at 17:18

## 2023-02-06 RX ADMIN — Medication 3 MILLIGRAM(S): at 21:34

## 2023-02-06 RX ADMIN — BENZOCAINE AND MENTHOL 1 LOZENGE: 5; 1 LIQUID ORAL at 11:34

## 2023-02-06 RX ADMIN — SIMETHICONE 80 MILLIGRAM(S): 80 TABLET, CHEWABLE ORAL at 20:36

## 2023-02-06 RX ADMIN — Medication 1 TABLET(S): at 17:18

## 2023-02-06 RX ADMIN — ENOXAPARIN SODIUM 40 MILLIGRAM(S): 100 INJECTION SUBCUTANEOUS at 11:33

## 2023-02-06 RX ADMIN — OXYCODONE HYDROCHLORIDE 5 MILLIGRAM(S): 5 TABLET ORAL at 08:00

## 2023-02-06 RX ADMIN — Medication 750 MILLIGRAM(S): at 12:00

## 2023-02-06 RX ADMIN — Medication 600 MILLIGRAM(S): at 21:34

## 2023-02-06 RX ADMIN — HYDROMORPHONE HYDROCHLORIDE 0.5 MILLIGRAM(S): 2 INJECTION INTRAMUSCULAR; INTRAVENOUS; SUBCUTANEOUS at 23:40

## 2023-02-06 RX ADMIN — OXYCODONE HYDROCHLORIDE 5 MILLIGRAM(S): 5 TABLET ORAL at 11:32

## 2023-02-06 RX ADMIN — SENNA PLUS 2 TABLET(S): 8.6 TABLET ORAL at 21:35

## 2023-02-06 RX ADMIN — Medication 300 GRAM(S): at 06:47

## 2023-02-06 RX ADMIN — ONDANSETRON 4 MILLIGRAM(S): 8 TABLET, FILM COATED ORAL at 23:42

## 2023-02-06 RX ADMIN — Medication 300 MILLIGRAM(S): at 10:43

## 2023-02-06 RX ADMIN — Medication 750 MILLIGRAM(S): at 05:33

## 2023-02-06 RX ADMIN — SODIUM CHLORIDE 1000 MILLILITER(S): 9 INJECTION, SOLUTION INTRAVENOUS at 23:00

## 2023-02-06 RX ADMIN — OXYCODONE HYDROCHLORIDE 5 MILLIGRAM(S): 5 TABLET ORAL at 16:45

## 2023-02-06 NOTE — PROGRESS NOTE ADULT - ASSESSMENT
36yo POD#2 s/p pLTCS for periurethral cyst (ebl 500) and POD#1 from RTOR for suspected abdominal bleed with evacuation of 1L hemoperitoneum with no identified bleed s/p 3U pRBC. Patient is under care of SICU for close hemodynamic monitoring post-op. Patient overall recovering well postoperatively.     #acute blood loss anemia  - s/p RTOR and evacuation of 1L hemoperitoneum   - Murry in situ   - H/H stable on serial CBCs, most recently 10/29  - pad counts  - diet, fluids, labs per SICU     #Transaminitis  - mild elevation in AST/ALT 34/35->45/36->43/33  - will cont to trend     #postpartum state  - SCDs for DVT PPx  - Encourage IS  - Continue tylenol, oxycodone PRN for pain control  - breast pump PRN    Dispo; appreciate excellent SICU care    Cat Childress MD PGY3

## 2023-02-06 NOTE — PROGRESS NOTE ADULT - ATTENDING COMMENTS
OB Attg:  Pt seen and examined. I agree with above assessment and plan. Appreciate SICU care. Pt to be placed on augmentin for urethral diverticular pain? abscess/cyst. pt to have decompressive procedure as outpt with Dr. Kwan in 1 week as per Dr. Kwan.  Routine postop care.  NGUYEN Strauss MD

## 2023-02-06 NOTE — PROGRESS NOTE ADULT - ASSESSMENT
##Neuro##  Non sedated   Pain   -Tyl PRN     ##Resp##  JULISSA   100% RA     ##Cardiac##  Hypotension / Hemorrhagic shock       RPT 1 hour cbc q4 after   Monitor BP     ##GI###  S/p C section and ex lap   Monitor abd serial abd exams,   Monitor LFT     :   LR @100 /hr     ID  JULISSA     Heme  Trend serial CBC   HGB stable in 10.x x2     Endo  No active issues     Lines 2x peripheral and A line    ##Neuro##  Non sedated   Pain   -Tyl PRN     ##Resp##  JULISSA   100% RA     ##Cardiac##  Hypotension / Hemorrhagic shock       RPT 1 hour cbc q4 after   Monitor BP     ##GI###  S/p C section and ex lap   Monitor abd serial abd exams,   Monitor LFT     :   LR @100 /hr     ID  JULISSA   On Unasyn and cefazolin     Heme  Trend serial CBC   HGB stable in 10.x x2   dc SubQ heparin    Endo  No active issues     Lines 2x peripheral and A line

## 2023-02-06 NOTE — PROGRESS NOTE ADULT - ASSESSMENT
A/P: 38yo POD#2 s/p pLTCS for periurethral cyst (ebl 500) and POD#1 from RTOR for suspected abdominal bleed with evacuation of 1L hemoperitoneum with no identified bleed s/p 3U pRBC. Patient is stable and doing well post-operatively.      #acute blood loss anemia  - Murry in situ   - NPO  - IVF  - H/H stable on serial CBCs  - pad counts  - f/u CTAP    #postpartum state  - SCDs for DVT PPx  - Encourage IS  - Continue tylenol, oxycodone PRN for pain control  - breast pump PRN    Dispo; appriciate excellent SICU care    Gretchen Lamb, PGY-3

## 2023-02-06 NOTE — CONSULT NOTE ADULT - ATTENDING COMMENTS
ATTENDING ATTESTATION:    37F postpartum s/p  () c/b postoperative hemorrhoage likely secondary to omental bleed s/p midline ex-lap and evacuation of hematoma.    N - tylenol, oxy prn   C - no signs of bleeding, MAP>65, no presors  P - supplemental O2 prn  G - NPO except meds, bowel regimen  R - no JAY, appears hypervolemic  H - Q4h CBC, no vte prophylaxis  I - no antibiotics  E - BG<180  DISPO - full code, SICU      I have seen and examined this patient with the ICU resident/APC. I have reviewed all new labs, imaging and reports. I have participated in formulating the plan, and have read and agree with the history, ROS, exam, assessment and plan as stated above.    Total time spent in the critical care of this patient today (excluding teaching & procedures): 40 minutes    Over 50% of the total time was spent in discussion and coordination of care with consulting services, dietary and rehab services.    Patience Freeman MD  Surgical Critical Care

## 2023-02-06 NOTE — CONSULT NOTE ADULT - SUBJECTIVE AND OBJECTIVE BOX
CC: 37y old Female admitted with a chief complaint of delivery, now hemoperitoneum     HPI:  37F CS delivery about 24 hrs ago. Pt presented s/p csection after going to bathroom with episode of severe abdominal pain and hypotension in mostly in upper quadrants (10/10)  US showed complex fluid around liver. PT was ex lap and found to have approx 1L of clot / fluid total without any signs of active bleed Intraop got 3u prbc. .General surgery called for intraop consult as uterus was not thought to be source of bleeding and clots noted to be evacuated from RUQ.       PMHx: No pertinent past medical history      PSHx: No significant past surgical history      Medications (inpatient): acetaminophen   IVPB .. 750 milliGRAM(s) IV Intermittent every 6 hours  chlorhexidine 2% Cloths 1 Application(s) Topical <User Schedule>  lactated ringers. 1000 milliLiter(s) IV Continuous <Continuous>  oxytocin Infusion 333.333 milliUNIT(s)/Min IV Continuous <Continuous>  oxytocin Infusion 333.333 milliUNIT(s)/Min IV Continuous <Continuous>    Medications (PRN):  Allergies: No Known Allergies  (Intolerances: )  Social Hx:   Family Hx: No pertinent family history in first degree relatives        Physical Exam  T(C): 36.6  HR: 65 (60 - 93)  BP: 123/76 (107/65 - 130/77)  RR: 20 (18 - 20)  SpO2: 100% (98% - 100%)  Tmax: T(C): , Max: 36.9 (02-05-23 @ 06:25)    02-04-23  -  02-05-23  --------------------------------------------------------  IN:    IV PiggyBack: 100 mL  Total IN: 100 mL    OUT:    Indwelling Catheter - Urethral (mL): 300 mL  Total OUT: 300 mL    Total NET: -200 mL      02-05-23  -  02-06-23  --------------------------------------------------------  IN:    Lactated Ringers: 100 mL  Total IN: 100 mL    OUT:    Indwelling Catheter - Urethral (mL): 350 mL    Voided (mL): 800 mL  Total OUT: 1150 mL    Total NET: -1050 mL        Labs:                        10.6   13.12 )-----------( 157      ( 05 Feb 2023 23:39 )             30.6     PT/INR - ( 05 Feb 2023 22:32 )   PT: 12.1 sec;   INR: 1.04 ratio         PTT - ( 05 Feb 2023 22:32 )  PTT:24.2 sec  02-05    136  |  106  |  7   ----------------------------<  124<H>  4.8   |  20<L>  |  0.52    Ca    8.2<L>      05 Feb 2023 22:32  Phos  4.2     02-05  Mg     1.6     02-05    TPro  4.7<L>  /  Alb  2.4<L>  /  TBili  0.6  /  DBili  x   /  AST  45<H>  /  ALT  36  /  AlkPhos  199<H>  02-05      ABG - ( 05 Feb 2023 23:30 )  pH, Arterial: 7.37  pH, Blood: x     /  pCO2: 36    /  pO2: 128   / HCO3: 21    / Base Excess: -4.0  /  SaO2: 99.8                  Imaging and other studies:

## 2023-02-06 NOTE — PROGRESS NOTE ADULT - SUBJECTIVE AND OBJECTIVE BOX
R3 Progress Note  POD #2/1    HD#3    Pt seen and examined at bedside in SICU. Patient complaining of pain at urethral cyst location. Pain well controlled on current regimen.   She denies SOB/CP/palpitations, fever/chills, nausea/emesis. NPO. Not yet OOB,  No flatus, tristan in place    MEDICATIONS  (STANDING):  acetaminophen   IVPB .. 750 milliGRAM(s) IV Intermittent every 6 hours  benzocaine/menthol Lozenge 1 Lozenge Oral four times a day  chlorhexidine 2% Cloths 1 Application(s) Topical <User Schedule>  lactated ringers. 1000 milliLiter(s) (100 mL/Hr) IV Continuous <Continuous>  melatonin 3 milliGRAM(s) Oral at bedtime    MEDICATIONS  (PRN):  oxyCODONE    IR 5 milliGRAM(s) Oral every 4 hours PRN mod/severe pain        Vital Signs Last 24 Hrs  T(C): 37.1 (06 Feb 2023 08:00), Max: 37.1 (06 Feb 2023 08:00)  T(F): 98.8 (06 Feb 2023 08:00), Max: 98.8 (06 Feb 2023 08:00)  HR: 57 (06 Feb 2023 08:00) (50 - 93)  BP: 123/76 (05 Feb 2023 22:12) (107/65 - 130/77)  BP(mean): 95 (05 Feb 2023 22:12) (81 - 95)  RR: 16 (06 Feb 2023 08:00) (11 - 23)  SpO2: 100% (06 Feb 2023 08:00) (96% - 100%)    Parameters below as of 06 Feb 2023 08:00  Patient On (Oxygen Delivery Method): room air      Physical Exam:  General: NAD  Abdomen: Soft, mildly distended, mild tenderness in RUQ and RLQ, fundus firm  Incision: Pfannensteil and midline incision CDI, subcuticular suture closure  Pelvic: Lochia gunnar      02-05 @ 07:01  -  02-06 @ 07:00  --------------------------------------------------------  IN: 1150 mL / OUT: 2770 mL / NET: -1620 mL    02-06 @ 07:01  -  02-06 @ 08:11  --------------------------------------------------------  IN: 150 mL / OUT: 200 mL / NET: -50 mL            Labs, additional tests:             10.0   13.44 )-----------( 155      ( 02-06 @ 04:55 )             29.0                10.6   13.12 )-----------( 157      ( 02-05 @ 23:39 )             30.6                10.7   14.07 )-----------( 155      ( 02-05 @ 22:32 )             32.2                8.3    13.14 )-----------( 213      ( 02-05 @ 17:27 )             25.9                10.7   15.56 )-----------( 218      ( 02-05 @ 07:00 )             32.5                12.4   7.77  )-----------( 255      ( 02-04 @ 18:05 )             37.9       02-06    137  |  106  |  6<L>  ----------------------------<  123<H>  4.1   |  21<L>  |  0.48<L>    Ca    8.2<L>      06 Feb 2023 04:55  Phos  5.0     02-06  Mg     1.5     02-06    TPro  4.5<L>  /  Alb  2.4<L>  /  TBili  0.3  /  DBili  x   /  AST  43<H>  /  ALT  33  /  AlkPhos  181<H>  02-06

## 2023-02-06 NOTE — CHART NOTE - NSCHARTNOTEFT_GEN_A_CORE
POST-OPERATIVE CHECK    SUBJECTIVE  Patient is s/p 2/5 RTOR (2/5 csection) for intraabdominal bleed with intraop gen surgery consult. 1L hematoma evacuated. No active bleeding. Pt c/o abd pain. Denies nausea, vomiting, chest pain.    Vital Signs Last 24 Hrs  T(C): 36.8 (06 Feb 2023 03:00), Max: 36.9 (05 Feb 2023 13:00)  T(F): 98.3 (06 Feb 2023 03:00), Max: 98.4 (05 Feb 2023 13:00)  HR: 55 (06 Feb 2023 06:00) (53 - 93)  BP: 123/76 (05 Feb 2023 22:12) (107/65 - 130/77)  BP(mean): 95 (05 Feb 2023 22:12) (81 - 95)  RR: 23 (06 Feb 2023 06:00) (11 - 23)  SpO2: 96% (06 Feb 2023 06:00) (96% - 100%)    Parameters below as of 05 Feb 2023 23:00  Patient On (Oxygen Delivery Method): room air      I&O's Detail    04 Feb 2023 07:01  -  05 Feb 2023 07:00  --------------------------------------------------------  IN:    IV PiggyBack: 100 mL  Total IN: 100 mL    OUT:    Indwelling Catheter - Urethral (mL): 300 mL  Total OUT: 300 mL    Total NET: -200 mL      05 Feb 2023 07:01  -  06 Feb 2023 06:52  --------------------------------------------------------  IN:    IV PiggyBack: 250 mL    Lactated Ringers: 900 mL  Total IN: 1150 mL    OUT:    Indwelling Catheter - Urethral (mL): 1970 mL    Voided (mL): 800 mL  Total OUT: 2770 mL    Total NET: -1620 mL          PAST MEDICAL & SURGICAL HISTORY:  No pertinent past medical history      No significant past surgical history          PHYSICAL EXAM  General: NAD, resting comfortably in bed  Pulmonary: Nonlabored breathing, no respiratory distress  Cardiovascular: NSR  Abdominal: soft, diffusely tender, greatest around ex lap and Pfannenstiel incisions   Extremities: WWP    LABS                        10.0   13.44 )-----------( 155      ( 06 Feb 2023 04:55 )             29.0     02-06    137  |  106  |  6<L>  ----------------------------<  123<H>  4.1   |  21<L>  |  0.48<L>    Ca    8.2<L>      06 Feb 2023 04:55  Phos  5.0     02-06  Mg     1.5     02-06    TPro  4.5<L>  /  Alb  2.4<L>  /  TBili  0.3  /  DBili  x   /  AST  43<H>  /  ALT  33  /  AlkPhos  181<H>  02-06    PT/INR - ( 06 Feb 2023 04:55 )   PT: 12.5 sec;   INR: 1.08 ratio         PTT - ( 06 Feb 2023 04:55 )  PTT:24.1 sec  CAPILLARY BLOOD GLUCOSE      POCT Blood Glucose.: 126 mg/dL (05 Feb 2023 17:09)      IMAGING    ASSESSMENT  37F s/p 2/5 RTOR (2/5 csection) for intraabdominal bleed with intraop gen surgery consult. 1L hematoma evacuated. No active bleeding. Recovering appropriately in SICU.    PLAN  - Diet: NPO with sips n chips  - Pain control as needed  - DVT ppx per primary  - OOB and ambulating as tolerated  - F/u AM labs  - Care per primary  - Please reconsult ACS PRN    Acute Care Surgery  [phone]

## 2023-02-06 NOTE — BRIEF OPERATIVE NOTE - NSICDXBRIEFPOSTOP_GEN_ALL_CORE_FT
Surgery scheduled on 8/5/19 , [facility] w/Dr. Micheal Mora.  Confirmed #[id number] with Meena .  Post-op apt scheduled with Gato PRASAD at [facility] on 9/4/19 at 11:20am.    Email sent to [employer] for equip.          -------------------------------------------------------------------------------------------------------    L4-5 Laminectomy & TLIF - confirmed with patient.  Patient notified of surgery date.      Surgery Instructions:   (pt advised the following)    To contact their PCP, Dr. DENICE Momin, and schedule a preop exam 2 weeks prior to surgery.  H/P letter faxed    7-10  days prior to your appointment DO NOT TAKE ASPIRIN OR ASPIRIN CONTAINING PRODUCTS. (Only Tylenol is aspirin free). If you take coumadin or other blood thinners contact your primary care physician..  ASA letter mailed.    Do not eat or drink after midnight the night before your surgery.     [facility] - ins verified.  Auth started.      Patient verbalized understanding all instructions.  Confirmation letter mailed 7/24/19.    
POST-OP DIAGNOSIS:  Hemoperitoneum 05-Feb-2023 22:18:47  Eze Martino  
POST-OP DIAGNOSIS:  Hemoperitoneum 05-Feb-2023 22:18:47  Eze Martino

## 2023-02-06 NOTE — PROGRESS NOTE ADULT - SUBJECTIVE AND OBJECTIVE BOX
Patient seen and examined at bedside, no acute overnight events. No acute concerns. Mild RUQ & RLL pain, 2/10, worse with inspiration. NPO. Murry in situ. Denies CP, SOB, N/V, HA, blurred vision, epigastric pain.    Vital Signs Last 24 Hours  T(C): 36.6 (02-05-23 @ 23:00), Max: 36.9 (02-05-23 @ 06:25)  HR: 60 (02-06-23 @ 02:00) (60 - 93)  BP: 123/76 (02-05-23 @ 22:12) (107/65 - 130/77)  RR: 23 (02-06-23 @ 02:00) (15 - 23)  SpO2: 97% (02-06-23 @ 02:00) (97% - 100%)    I&O's Summary    04 Feb 2023 07:01  -  05 Feb 2023 07:00  --------------------------------------------------------  IN: 100 mL / OUT: 300 mL / NET: -200 mL    05 Feb 2023 07:01  -  06 Feb 2023 03:02  --------------------------------------------------------  IN: 475 mL / OUT: 2025 mL / NET: -1550 mL        Physical Exam:  General: NAD  Abdomen: Soft, mildly distended, mild tenderness in RUQ and RLQ, fundus firm  Incision: Pfannensteil and midline incision CDI, subcuticular suture closure  Pelvic: Lochia wnl    Labs:    Blood Type: O Positive  Antibody Screen: Negative  RPR: Negative               10.6   13.12 )-----------( 157      ( 02-05 @ 23:39 )             30.6                10.7   14.07 )-----------( 155      ( 02-05 @ 22:32 )             32.2                8.3    13.14 )-----------( 213      ( 02-05 @ 17:27 )             25.9         MEDICATIONS  (STANDING):  acetaminophen   IVPB .. 750 milliGRAM(s) IV Intermittent every 6 hours  benzocaine/menthol Lozenge 1 Lozenge Oral four times a day  chlorhexidine 2% Cloths 1 Application(s) Topical <User Schedule>  lactated ringers. 1000 milliLiter(s) (100 mL/Hr) IV Continuous <Continuous>  melatonin 3 milliGRAM(s) Oral at bedtime    MEDICATIONS  (PRN):  oxyCODONE    IR 5 milliGRAM(s) Oral every 4 hours PRN mod/severe pain

## 2023-02-06 NOTE — BRIEF OPERATIVE NOTE - NSICDXBRIEFPREOP_GEN_ALL_CORE_FT
PRE-OP DIAGNOSIS:  Hemoperitoneum 05-Feb-2023 22:18:40  Eze Martino  
PRE-OP DIAGNOSIS:  Hemoperitoneum 05-Feb-2023 22:18:40  Eze Martino

## 2023-02-06 NOTE — BRIEF OPERATIVE NOTE - NSICDXBRIEFPROCEDURE_GEN_ALL_CORE_FT
PROCEDURES:  Exploratory laparotomy 05-Feb-2023 22:18:31  Eze Martino  
PROCEDURES:  Exploratory laparotomy 05-Feb-2023 22:18:31  Eze Martino

## 2023-02-06 NOTE — CONSULT NOTE ADULT - ASSESSMENT
38y/o POD1 from  RTOR due to concern for hemoperitoneum in setting of hypotension, general surgery consulted for further exploration. S/p ex lap no active bleeding found in SICU for hemodynamic monitoring     - NPO   - IVF   - monitor GI fxn   - serial CBCs   - care per primary team     Patient seen and examined with attending   [id number]

## 2023-02-06 NOTE — BRIEF OPERATIVE NOTE - OPERATION/FINDINGS
intraop consult for OB due to concern intra-abdominal source of hemoperitoneum, clots evacuated from RUQ, decision made to extended Pfannenstiel incision to midline incision to obtain better exposure, all 4 quadrants inspected no active bleeding noted, clot noted on great omentum that was removed no residual bleeding noted, abdominal cavity was irrigated, no further bleeding noted, fascia closed with loop PDS
Grossly normal uterus, bilateral fallopian tubes and ovaries. Hysterotomy intact with no signs of bleeding. General surgery called in to assist with exploration. Decision made to extend Pfannenstiel incision vertically for better visualization. See Surgery op note. Total of 1L of hemoperitoneum evacuated.

## 2023-02-07 LAB
ALBUMIN SERPL ELPH-MCNC: 2.5 G/DL — LOW (ref 3.3–5)
ALBUMIN SERPL ELPH-MCNC: 2.7 G/DL — LOW (ref 3.3–5)
ALP SERPL-CCNC: 163 U/L — HIGH (ref 40–120)
ALP SERPL-CCNC: 193 U/L — HIGH (ref 40–120)
ALT FLD-CCNC: 27 U/L — SIGNIFICANT CHANGE UP (ref 10–45)
ALT FLD-CCNC: 31 U/L — SIGNIFICANT CHANGE UP (ref 10–45)
ANION GAP SERPL CALC-SCNC: 12 MMOL/L — SIGNIFICANT CHANGE UP (ref 5–17)
ANION GAP SERPL CALC-SCNC: 9 MMOL/L — SIGNIFICANT CHANGE UP (ref 5–17)
APTT BLD: 26.2 SEC — LOW (ref 27.5–35.5)
AST SERPL-CCNC: 34 U/L — SIGNIFICANT CHANGE UP (ref 10–40)
AST SERPL-CCNC: 42 U/L — HIGH (ref 10–40)
BASOPHILS # BLD AUTO: 0.02 K/UL — SIGNIFICANT CHANGE UP (ref 0–0.2)
BASOPHILS NFR BLD AUTO: 0.2 % — SIGNIFICANT CHANGE UP (ref 0–2)
BILIRUB SERPL-MCNC: 0.3 MG/DL — SIGNIFICANT CHANGE UP (ref 0.2–1.2)
BILIRUB SERPL-MCNC: 0.3 MG/DL — SIGNIFICANT CHANGE UP (ref 0.2–1.2)
BUN SERPL-MCNC: 6 MG/DL — LOW (ref 7–23)
BUN SERPL-MCNC: 7 MG/DL — SIGNIFICANT CHANGE UP (ref 7–23)
CALCIUM SERPL-MCNC: 8.4 MG/DL — SIGNIFICANT CHANGE UP (ref 8.4–10.5)
CALCIUM SERPL-MCNC: 8.8 MG/DL — SIGNIFICANT CHANGE UP (ref 8.4–10.5)
CHLORIDE SERPL-SCNC: 103 MMOL/L — SIGNIFICANT CHANGE UP (ref 96–108)
CHLORIDE SERPL-SCNC: 103 MMOL/L — SIGNIFICANT CHANGE UP (ref 96–108)
CO2 SERPL-SCNC: 24 MMOL/L — SIGNIFICANT CHANGE UP (ref 22–31)
CO2 SERPL-SCNC: 27 MMOL/L — SIGNIFICANT CHANGE UP (ref 22–31)
CREAT SERPL-MCNC: 0.58 MG/DL — SIGNIFICANT CHANGE UP (ref 0.5–1.3)
CREAT SERPL-MCNC: 0.65 MG/DL — SIGNIFICANT CHANGE UP (ref 0.5–1.3)
EGFR: 116 ML/MIN/1.73M2 — SIGNIFICANT CHANGE UP
EGFR: 119 ML/MIN/1.73M2 — SIGNIFICANT CHANGE UP
EOSINOPHIL # BLD AUTO: 0.08 K/UL — SIGNIFICANT CHANGE UP (ref 0–0.5)
EOSINOPHIL NFR BLD AUTO: 0.7 % — SIGNIFICANT CHANGE UP (ref 0–6)
FIBRINOGEN PPP-MCNC: 712 MG/DL — HIGH (ref 200–445)
GLUCOSE SERPL-MCNC: 93 MG/DL — SIGNIFICANT CHANGE UP (ref 70–99)
GLUCOSE SERPL-MCNC: 99 MG/DL — SIGNIFICANT CHANGE UP (ref 70–99)
HCT VFR BLD CALC: 30.8 % — LOW (ref 34.5–45)
HCT VFR BLD CALC: 32.1 % — LOW (ref 34.5–45)
HGB BLD-MCNC: 10.1 G/DL — LOW (ref 11.5–15.5)
HGB BLD-MCNC: 10.7 G/DL — LOW (ref 11.5–15.5)
IMM GRANULOCYTES NFR BLD AUTO: 0.7 % — SIGNIFICANT CHANGE UP (ref 0–0.9)
INR BLD: 1.02 RATIO — SIGNIFICANT CHANGE UP (ref 0.88–1.16)
LYMPHOCYTES # BLD AUTO: 1.89 K/UL — SIGNIFICANT CHANGE UP (ref 1–3.3)
LYMPHOCYTES # BLD AUTO: 16.6 % — SIGNIFICANT CHANGE UP (ref 13–44)
MCHC RBC-ENTMCNC: 27.7 PG — SIGNIFICANT CHANGE UP (ref 27–34)
MCHC RBC-ENTMCNC: 27.7 PG — SIGNIFICANT CHANGE UP (ref 27–34)
MCHC RBC-ENTMCNC: 32.8 GM/DL — SIGNIFICANT CHANGE UP (ref 32–36)
MCHC RBC-ENTMCNC: 33.3 GM/DL — SIGNIFICANT CHANGE UP (ref 32–36)
MCV RBC AUTO: 83.2 FL — SIGNIFICANT CHANGE UP (ref 80–100)
MCV RBC AUTO: 84.6 FL — SIGNIFICANT CHANGE UP (ref 80–100)
MONOCYTES # BLD AUTO: 0.96 K/UL — HIGH (ref 0–0.9)
MONOCYTES NFR BLD AUTO: 8.4 % — SIGNIFICANT CHANGE UP (ref 2–14)
NEUTROPHILS # BLD AUTO: 8.38 K/UL — HIGH (ref 1.8–7.4)
NEUTROPHILS NFR BLD AUTO: 73.4 % — SIGNIFICANT CHANGE UP (ref 43–77)
NRBC # BLD: 0 /100 WBCS — SIGNIFICANT CHANGE UP (ref 0–0)
NRBC # BLD: 0 /100 WBCS — SIGNIFICANT CHANGE UP (ref 0–0)
PLATELET # BLD AUTO: 195 K/UL — SIGNIFICANT CHANGE UP (ref 150–400)
PLATELET # BLD AUTO: 199 K/UL — SIGNIFICANT CHANGE UP (ref 150–400)
POTASSIUM SERPL-MCNC: 3.8 MMOL/L — SIGNIFICANT CHANGE UP (ref 3.5–5.3)
POTASSIUM SERPL-MCNC: 3.9 MMOL/L — SIGNIFICANT CHANGE UP (ref 3.5–5.3)
POTASSIUM SERPL-SCNC: 3.8 MMOL/L — SIGNIFICANT CHANGE UP (ref 3.5–5.3)
POTASSIUM SERPL-SCNC: 3.9 MMOL/L — SIGNIFICANT CHANGE UP (ref 3.5–5.3)
PROT SERPL-MCNC: 5 G/DL — LOW (ref 6–8.3)
PROT SERPL-MCNC: 5.4 G/DL — LOW (ref 6–8.3)
PROTHROM AB SERPL-ACNC: 11.7 SEC — SIGNIFICANT CHANGE UP (ref 10.5–13.4)
RBC # BLD: 3.64 M/UL — LOW (ref 3.8–5.2)
RBC # BLD: 3.86 M/UL — SIGNIFICANT CHANGE UP (ref 3.8–5.2)
RBC # FLD: 15.6 % — HIGH (ref 10.3–14.5)
RBC # FLD: 15.7 % — HIGH (ref 10.3–14.5)
SODIUM SERPL-SCNC: 139 MMOL/L — SIGNIFICANT CHANGE UP (ref 135–145)
SODIUM SERPL-SCNC: 139 MMOL/L — SIGNIFICANT CHANGE UP (ref 135–145)
WBC # BLD: 10.67 K/UL — HIGH (ref 3.8–10.5)
WBC # BLD: 11.41 K/UL — HIGH (ref 3.8–10.5)
WBC # FLD AUTO: 10.67 K/UL — HIGH (ref 3.8–10.5)
WBC # FLD AUTO: 11.41 K/UL — HIGH (ref 3.8–10.5)

## 2023-02-07 PROCEDURE — 74177 CT ABD & PELVIS W/CONTRAST: CPT | Mod: 26

## 2023-02-07 RX ORDER — ACETAMINOPHEN 500 MG
1000 TABLET ORAL ONCE
Refills: 0 | Status: COMPLETED | OUTPATIENT
Start: 2023-02-07 | End: 2023-02-07

## 2023-02-07 RX ORDER — ACETAMINOPHEN 500 MG
1000 TABLET ORAL ONCE
Refills: 0 | Status: DISCONTINUED | OUTPATIENT
Start: 2023-02-07 | End: 2023-02-10

## 2023-02-07 RX ORDER — METOCLOPRAMIDE HCL 10 MG
10 TABLET ORAL EVERY 6 HOURS
Refills: 0 | Status: DISCONTINUED | OUTPATIENT
Start: 2023-02-07 | End: 2023-02-11

## 2023-02-07 RX ORDER — HYDROMORPHONE HYDROCHLORIDE 2 MG/ML
1 INJECTION INTRAMUSCULAR; INTRAVENOUS; SUBCUTANEOUS ONCE
Refills: 0 | Status: DISCONTINUED | OUTPATIENT
Start: 2023-02-07 | End: 2023-02-07

## 2023-02-07 RX ORDER — METOCLOPRAMIDE HCL 10 MG
5 TABLET ORAL EVERY 6 HOURS
Refills: 0 | Status: DISCONTINUED | OUTPATIENT
Start: 2023-02-07 | End: 2023-02-07

## 2023-02-07 RX ORDER — KETOROLAC TROMETHAMINE 30 MG/ML
30 SYRINGE (ML) INJECTION EVERY 6 HOURS
Refills: 0 | Status: DISCONTINUED | OUTPATIENT
Start: 2023-02-07 | End: 2023-02-10

## 2023-02-07 RX ORDER — SODIUM CHLORIDE 9 MG/ML
1000 INJECTION, SOLUTION INTRAVENOUS
Refills: 0 | Status: DISCONTINUED | OUTPATIENT
Start: 2023-02-07 | End: 2023-02-08

## 2023-02-07 RX ORDER — HYDROMORPHONE HYDROCHLORIDE 2 MG/ML
0.5 INJECTION INTRAMUSCULAR; INTRAVENOUS; SUBCUTANEOUS EVERY 4 HOURS
Refills: 0 | Status: DISCONTINUED | OUTPATIENT
Start: 2023-02-07 | End: 2023-02-10

## 2023-02-07 RX ADMIN — Medication 3 MILLIGRAM(S): at 21:39

## 2023-02-07 RX ADMIN — POLYETHYLENE GLYCOL 3350 17 GRAM(S): 17 POWDER, FOR SOLUTION ORAL at 11:37

## 2023-02-07 RX ADMIN — ENOXAPARIN SODIUM 40 MILLIGRAM(S): 100 INJECTION SUBCUTANEOUS at 11:36

## 2023-02-07 RX ADMIN — Medication 1000 MILLIGRAM(S): at 04:05

## 2023-02-07 RX ADMIN — Medication 10 MILLIGRAM(S): at 03:20

## 2023-02-07 RX ADMIN — HYDROMORPHONE HYDROCHLORIDE 0.5 MILLIGRAM(S): 2 INJECTION INTRAMUSCULAR; INTRAVENOUS; SUBCUTANEOUS at 01:00

## 2023-02-07 RX ADMIN — HYDROMORPHONE HYDROCHLORIDE 1 MILLIGRAM(S): 2 INJECTION INTRAMUSCULAR; INTRAVENOUS; SUBCUTANEOUS at 01:00

## 2023-02-07 RX ADMIN — Medication 1 TABLET(S): at 19:36

## 2023-02-07 RX ADMIN — Medication 30 MILLIGRAM(S): at 11:36

## 2023-02-07 RX ADMIN — Medication 30 MILLIGRAM(S): at 12:30

## 2023-02-07 RX ADMIN — Medication 30 MILLIGRAM(S): at 05:30

## 2023-02-07 RX ADMIN — Medication 30 MILLIGRAM(S): at 18:05

## 2023-02-07 RX ADMIN — Medication 1000 MILLIGRAM(S): at 16:00

## 2023-02-07 RX ADMIN — Medication 400 MILLIGRAM(S): at 10:07

## 2023-02-07 RX ADMIN — Medication 400 MILLIGRAM(S): at 21:39

## 2023-02-07 RX ADMIN — Medication 1 TABLET(S): at 05:28

## 2023-02-07 RX ADMIN — HYDROMORPHONE HYDROCHLORIDE 0.5 MILLIGRAM(S): 2 INJECTION INTRAMUSCULAR; INTRAVENOUS; SUBCUTANEOUS at 18:14

## 2023-02-07 RX ADMIN — Medication 400 MILLIGRAM(S): at 03:28

## 2023-02-07 RX ADMIN — Medication 1000 MILLIGRAM(S): at 22:30

## 2023-02-07 RX ADMIN — Medication 30 MILLIGRAM(S): at 05:28

## 2023-02-07 RX ADMIN — HYDROMORPHONE HYDROCHLORIDE 1 MILLIGRAM(S): 2 INJECTION INTRAMUSCULAR; INTRAVENOUS; SUBCUTANEOUS at 00:15

## 2023-02-07 RX ADMIN — Medication 30 MILLIGRAM(S): at 23:36

## 2023-02-07 RX ADMIN — SIMETHICONE 80 MILLIGRAM(S): 80 TABLET, CHEWABLE ORAL at 11:37

## 2023-02-07 RX ADMIN — SENNA PLUS 2 TABLET(S): 8.6 TABLET ORAL at 21:39

## 2023-02-07 RX ADMIN — Medication 400 MILLIGRAM(S): at 15:31

## 2023-02-07 RX ADMIN — HYDROMORPHONE HYDROCHLORIDE 0.5 MILLIGRAM(S): 2 INJECTION INTRAMUSCULAR; INTRAVENOUS; SUBCUTANEOUS at 19:30

## 2023-02-07 RX ADMIN — Medication 1000 MILLIGRAM(S): at 11:50

## 2023-02-07 NOTE — PROGRESS NOTE ADULT - SUBJECTIVE AND OBJECTIVE BOX
Patient seen and examined at bedside. Overnight patient with acute onset severe abdominal pain, nausea and vomiting. Stat labs were stable and CTAP showed distended bladder and postpartum uterus on prelim read. Pt switched to IV pain medications and clear liquid diet. Murry placed. Pain now improved, though pt still notes abdominal pain, greatest in RLQ. Not yet passing flatus. Denies CP, SOB, N/V, HA, blurred vision, epigastric pain.    Vital Signs Last 24 Hours  T(C): 36.5 (02-07-23 @ 00:12), Max: 37.1 (02-06-23 @ 08:00)  HR: 81 (02-07-23 @ 00:12) (50 - 81)  BP: 128/70 (02-07-23 @ 00:12) (108/66 - 132/84)  RR: 18 (02-07-23 @ 00:12) (11 - 26)  SpO2: 96% (02-07-23 @ 00:12) (96% - 100%)      Physical Exam:  General: NAD  Abdomen: Soft, mildly distended, mild tenderness in RUQ and RLQ, fundus firm  Incision: Pfannensteil and midline incision CDI, subcuticular suture closure  Pelvic: Lochia wnl    Labs:    Blood Type: O Positive  Antibody Screen: Negative  RPR: Negative               10.7   11.41 )-----------( 195      ( 02-06 @ 23:55 )             32.1                9.9    12.63 )-----------( 165      ( 02-06 @ 09:13 )             29.5                10.0   13.44 )-----------( 155      ( 02-06 @ 04:55 )             29.0         MEDICATIONS  (STANDING):  acetaminophen   IVPB .. 1000 milliGRAM(s) IV Intermittent once  amoxicillin  875 milliGRAM(s)/clavulanate 1 Tablet(s) Oral every 12 hours  benzocaine/menthol Lozenge 1 Lozenge Oral four times a day  chlorhexidine 2% Cloths 1 Application(s) Topical <User Schedule>  enoxaparin Injectable 40 milliGRAM(s) SubCutaneous every 24 hours  ketorolac   Injectable 30 milliGRAM(s) IV Push every 6 hours  melatonin 3 milliGRAM(s) Oral at bedtime  polyethylene glycol 3350 17 Gram(s) Oral daily  senna 2 Tablet(s) Oral at bedtime  simethicone 80 milliGRAM(s) Chew daily    MEDICATIONS  (PRN):  HYDROmorphone  Injectable 0.5 milliGRAM(s) IV Push every 4 hours PRN Severe Pain (7 - 10)  metoclopramide Injectable 10 milliGRAM(s) IV Push every 6 hours PRN nausea    < from: CT Abdomen and Pelvis w/ IV Cont (02.07.23 @ 00:00) >  ******PRELIMINARY REPORT******      ******PRELIMINARY REPORT******         ACC: [id number] EXAM:  CT ABDOMEN AND PELVIS IC   ORDERED BY: CAROLINE SARMIENTO     PROCEDURE DATE:  02/07/2023    ******PRELIMINARY REPORT******      ******PRELIMINARY REPORT******           INTERPRETATION:  -Markedly distended bladder  -Uterus is enlarged and there is hypervascularity to the myometrium   particularly at the fundus which could be due to postpartum state  -No active bleeding or intraperitoneal hematoma        ******PRELIMINARY REPORT******      ******PRELIMINARY REPORT******        CHRIS VIZCAINO MD; Resident Radiologist  This document is a PRELIMINARY interpretation and is pending final   attending approval. Feb 7 2023 12:41AM    < end of copied text >     Patient seen and examined at bedside. Overnight patient with acute onset severe abdominal pain, nausea and vomiting. Stat labs were stable and CTAP showed distended bladder and postpartum uterus on prelim read. Pt switched to IV pain medications and clear liquid diet. Murry placed. Pain now improved, though pt still notes abdominal pain, greatest in RUQ. Not yet passing flatus. Denies CP, SOB, N/V, HA, blurred vision, epigastric pain.    Vital Signs Last 24 Hours  T(C): 36.5 (02-07-23 @ 00:12), Max: 37.1 (02-06-23 @ 08:00)  HR: 81 (02-07-23 @ 00:12) (50 - 81)  BP: 128/70 (02-07-23 @ 00:12) (108/66 - 132/84)  RR: 18 (02-07-23 @ 00:12) (11 - 26)  SpO2: 96% (02-07-23 @ 00:12) (96% - 100%)      Physical Exam:  General: NAD  Abdomen: Soft, mildly distended, mild tenderness in RUQ and RLQ carl-incisional, fundus firm  Incision: Pfannensteil and midline incision CDI, subcuticular suture closure  Pelvic: Lochia wnl    Labs:    Blood Type: O Positive  Antibody Screen: Negative  RPR: Negative               10.7   11.41 )-----------( 195      ( 02-06 @ 23:55 )             32.1                9.9    12.63 )-----------( 165      ( 02-06 @ 09:13 )             29.5                10.0   13.44 )-----------( 155      ( 02-06 @ 04:55 )             29.0         MEDICATIONS  (STANDING):  acetaminophen   IVPB .. 1000 milliGRAM(s) IV Intermittent once  amoxicillin  875 milliGRAM(s)/clavulanate 1 Tablet(s) Oral every 12 hours  benzocaine/menthol Lozenge 1 Lozenge Oral four times a day  chlorhexidine 2% Cloths 1 Application(s) Topical <User Schedule>  enoxaparin Injectable 40 milliGRAM(s) SubCutaneous every 24 hours  ketorolac   Injectable 30 milliGRAM(s) IV Push every 6 hours  melatonin 3 milliGRAM(s) Oral at bedtime  polyethylene glycol 3350 17 Gram(s) Oral daily  senna 2 Tablet(s) Oral at bedtime  simethicone 80 milliGRAM(s) Chew daily    MEDICATIONS  (PRN):  HYDROmorphone  Injectable 0.5 milliGRAM(s) IV Push every 4 hours PRN Severe Pain (7 - 10)  metoclopramide Injectable 10 milliGRAM(s) IV Push every 6 hours PRN nausea    < from: CT Abdomen and Pelvis w/ IV Cont (02.07.23 @ 00:00) >  ******PRELIMINARY REPORT******      ******PRELIMINARY REPORT******         ACC: [id number] EXAM:  CT ABDOMEN AND PELVIS IC   ORDERED BY: CAROLINE SARMIENTO     PROCEDURE DATE:  02/07/2023    ******PRELIMINARY REPORT******      ******PRELIMINARY REPORT******           INTERPRETATION:  -Markedly distended bladder  -Uterus is enlarged and there is hypervascularity to the myometrium   particularly at the fundus which could be due to postpartum state  -No active bleeding or intraperitoneal hematoma        ******PRELIMINARY REPORT******      ******PRELIMINARY REPORT******        CHRIS VIZCAINO MD; Resident Radiologist  This document is a PRELIMINARY interpretation and is pending final   attending approval. Feb 7 2023 12:41AM    < end of copied text >

## 2023-02-07 NOTE — PROGRESS NOTE ADULT - ASSESSMENT
38yo POD#3 s/p pLTCS for periurethral cyst (ebl 500) and POD#2 from RTOR for suspected abdominal bleed with evacuation of 1L hemoperitoneum with no identified bleed s/p 3U pRBC. Patient with poor pain control on POD#3 with CTAP showing no evidence of intraabdominal bleeding and stable H/H. Pt found to have significant urinary retention with return of approx 1.2L urine with placement of Murry. Since, patient with improved pain and VSS.     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 Patient with poor pain control on POD#3 with CTAP showing no evidence of intraabdominal bleeding and stable H/H. Pt found to have significant urinary retention with return of approx 1.2L urine with placement of Murry. Since, patient with improved pain and VSS. Will ctm for s/s of ileus.     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

## 2023-02-07 NOTE — PROVIDER CONTACT NOTE (OTHER) - SITUATION
Pt started vomiting after swallowing Oxycodone 10 mg pills. Pt complaining of 9/10 abdominal pain and shaking.
MD aware  pt request  for assessment of urethral cyst causing prevention of urination

## 2023-02-07 NOTE — CHART NOTE - NSCHARTNOTEFT_GEN_A_CORE
S: Pt seen at bedside d/t acute onset nausea, vomiting and abdominal pain this evening. Pt was taking Tylenol and oxy for pain when she started to develop emesis. After she felt severe abdominal pain, similar to when she had the RRT called and was taken back to the OR on 2/5. Last urinated at 6pm without difficulty. Notes some dizziness, but no syncope, fevers, chills or heavy vaginal bleeding.     Pt sen at bedside stat by PGY-3 Adonis. VSS (/84; HR 81). Pt crying in pain with softly distended abdomen, +rebound, +guarding, pain greatest in RLQ, VB wnl. Dilaudid 0.5mg, Zofran given. STAT CBC/CMP/Coags/T&S sent. CTAP ordered pt brought down for urgent CTAP by PGY3 Adonis and PGY 4 Julieta. Upon return from CT,  Grotel at bedside. Murry catheter placed with return of 1.2L clear urine. STAT H/H and repeat VS stable. Pt still with severe pain requiring 1mg Dilaudid. ACS team consulted given severity of pt's pain. Radiology called and will performed prelim read. Plan of care discussed with Dr Strauss throughout night.                 10.7   11.41 )-----------( 195      ( 02-06 @ 23:55 )             32.1                9.9    12.63 )-----------( 165      ( 02-06 @ 09:13 )             29.5                10.0   13.44 )-----------( 155      ( 02-06 @ 04:55 )             29.0     Gretchen Lamb, PGY-3 S: Pt seen at bedside d/t acute onset nausea, vomiting and abdominal pain this evening. Pt was taking Tylenol and oxy for pain when she started to develop emesis. After she felt severe abdominal pain, similar to when she had the RRT called and was taken back to the OR on 2/5. Last urinated at 6pm without difficulty. Not yet passing flatus. Notes some dizziness, but no syncope, fevers, chills or heavy vaginal bleeding.     Pt sen at bedside stat by PGY-3 Adonis. VSS (/84; HR 81). Pt crying in pain with softly distended abdomen, +rebound, +guarding, pain greatest in RLQ, VB wnl. Dilaudid 0.5mg, Zofran given. STAT CBC/CMP/Coags/T&S sent. CTAP ordered pt brought down for urgent CTAP by PGY3 Adonis and PGY 4 Julieta. Upon return from CT,  Grotel at bedside. Murry catheter placed with return of 1.2L clear urine. STAT H/H and repeat VS stable. Pt still with severe pain requiring 1mg Dilaudid. ACS team consulted given severity of pt's pain. Radiology called and will performed prelim read. Plan of care discussed with Dr Strauss throughout night.                 10.7   11.41 )-----------( 195      ( 02-06 @ 23:55 )             32.1                9.9    12.63 )-----------( 165      ( 02-06 @ 09:13 )             29.5                10.0   13.44 )-----------( 155      ( 02-06 @ 04:55 )             29.0     Gretchen Lamb, PGY-3 S: Pt seen at bedside d/t acute onset nausea, vomiting and abdominal pain this evening. Pt was taking Tylenol and oxy for pain when she started to develop emesis. After she felt severe abdominal pain, similar to when she had the RRT called and was taken back to the OR on 2/5. Last urinated at 6pm without difficulty. Not yet passing flatus. Notes some dizziness, but no syncope, fevers, chills or heavy vaginal bleeding.     Pt sen at bedside stat by PGY-3 Adonis. VSS (/84; HR 81). Pt crying in pain with softly distended abdomen, +rebound, +guarding, pain greatest in RLQ, VB wnl. Dilaudid 0.5mg, Zofran given. STAT CBC/CMP/Coags/T&S sent. CTAP ordered pt brought down for urgent CTAP by PGY3 Adonis and PGY 4 Julieta. During move, pt crying in pain with every bump. Upon return from CT,  Grotel at bedside. Murry catheter placed with return of 1.2L clear urine. STAT H/H and repeat VS stable. Pt still with severe pain requiring 1mg Dilaudid. ACS team consulted given severity of pt's pain. Radiology called and will performed prelim read. Plan of care discussed with Dr Strauss throughout night.                 10.7   11.41 )-----------( 195      ( 02-06 @ 23:55 )             32.1                9.9    12.63 )-----------( 165      ( 02-06 @ 09:13 )             29.5                10.0   13.44 )-----------( 155      ( 02-06 @ 04:55 )             29.0     Gretchen Lamb, PGY-3

## 2023-02-07 NOTE — PROGRESS NOTE ADULT - ASSESSMENT
36y/o POD1 from  RTOR due to concern for hemoperitoneum in setting of hypotension, general surgery consulted for further exploration. S/p ex lap no active bleeding found. ACS recalled for increasing abdominal pain in the postoperative period.    Recommendations:  - No acute surgical intervention at this time  - Would make NPO with IVF  - IV pain medications as patient is not tolerating PO  - Murry to decompress bladder  - Remainder of care per primary    Recommendations discussed with ACS attending, Dr. Bradford    ACS/Trauma Surgery  [id number] 36y/o POD2 from  RTOR due to concern for hemoperitoneum in setting of hypotension, general surgery consulted for further exploration. S/p ex lap no active bleeding found. ACS recalled for increasing abdominal pain in the postoperative period.    Recommendations:  - No acute surgical intervention at this time  - Would make NPO with IVF  - IV pain medications as patient is not tolerating PO  - Murry to decompress bladder  - Remainder of care per primary    Recommendations discussed with ACS attending, Dr. Bradford    ACS/Trauma Surgery  [id number] 38y/o POD2 from  RTOR due to concern for hemoperitoneum in setting of hypotension, general surgery consulted for further exploration. S/p ex lap no active bleeding found. ACS recalled for increasing abdominal pain in the postoperative period. Currently, vitals and H/H stable. CT without evidence for bleeding or hematoma.    Recommendations:  - No acute surgical intervention at this time  - Would make NPO with IVF  - IV pain medications as patient is not tolerating PO  - Murry to decompress bladder  - Remainder of care per primary    Recommendations discussed with ACS attending, Dr. Bradford    ACS/Trauma Surgery  [phone]

## 2023-02-07 NOTE — PROGRESS NOTE ADULT - ATTENDING COMMENTS
38yo POD#3 s/p pLTCS for periurethral cyst (ebl 500) and POD#2 from RTOR for suspected abdominal bleed with evacuation of 1L hemoperitoneum with no identified bleed s/p 3U pRBC. Patient with poor pain control on POD#3 with CTAP showing no evidence of intraabdominal bleeding and stable H/H. Pt found to have significant urinary retention with return of approx 1.2L urine with placement of Tristan. Since, patient with improved pain and VSS. goals to work on pain control, improve constipation. concern for ileus. 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 also urinary retention requiring tristan replacement  - Pt with urinary retention likely 2/2 pain/ constipation. work on bowel regimen  - c/w Augmentin (2/6- )  - Tristan in situ. plan to cont until passing flatus/ pain under improved control  -has outpt follow up w dr. farris next week    #postpartum state  - Lovenox, SCDs for DVT PPx  - Encourage IS  - Continue Toradol, Tylenol and IV Dilaudid for pain control  - breast pump PRN  -PT to assist w ambulation    Dispo; continued inpt care    yunier hutchinson MD 36yo POD#3 s/p pLTCS for periurethral cyst (ebl 500) and POD#2 from RTOR for suspected abdominal bleed with evacuation of 1L hemoperitoneum with no identified bleed s/p 3U pRBC. Patient with poor pain control on POD#3 with CTAP showing no evidence of intraabdominal bleeding and stable H/H. Pt found to have significant urinary retention with return of approx 1.2L urine with placement of Tristan. Since, patient with improved pain and VSS. goals to work on pain control, improve constipation. concern for ileus. 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 also urinary retention requiring tristan replacement  - Pt with urinary retention likely 2/2 pain/ constipation. work on bowel regimen  - c/w Augmentin (2/6- )  - Tristan in situ. plan to cont until passing flatus/ pain under improved control  -has outpt follow up w dr. farris next week    #postpartum state  - Lovenox, SCDs for DVT PPx  - Encourage IS  - Continue Toradol, Tylenol and IV Dilaudid for pain control  - breast pump PRN  -PT to assist w ambulation    Dispo; continued inpt care    yunier hutchinson MD

## 2023-02-07 NOTE — PROGRESS NOTE ADULT - SUBJECTIVE AND OBJECTIVE BOX
[facility] Surgery Daily Progress Note  =====================================================    SUBJECTIVE: Recalled for patient with acute severe abdominal pain. Patient reports having diffuse abdominal pain that started suddenly after an episode of vomiting. Patient is on a regular diet and PO medications, but not tolerating well. -/-.    --------------------------------------------------------------------------------------  OBJECTIVE:    VITAL SIGNS:  Vital Signs Last 24 Hrs  T(C): 36.5 (2023 00:12), Max: 37.1 (2023 08:00)  T(F): 97.7 (2023 00:12), Max: 98.8 (2023 08:00)  HR: 81 (2023 00:12) (50 - 81)  BP: 128/70 (2023 00:12) (108/66 - 132/84)  BP(mean): 94 (2023 12:00) (94 - 94)  RR: 18 (2023 00:12) (11 - 26)  SpO2: 96% (2023 00:12) (96% - 100%)    Parameters below as of 2023 00:12  Patient On (Oxygen Delivery Method): room air      --------------------------------------------------------------------------------------    EXAM:  General: Resting in bed, Patient looks unwell after having vomiting episode  Cardiac: Warm and well perfused. Vital signs wnl  Respiratory: Nonlabored respirations  Abdomen: soft, tender to palpation in all 4 quadrants, moderately distended. Midline incision c/d/i  : Murry placed with 1.2L of urine return     --------------------------------------------------------------------------------------    LABS:                        10.7   11.41 )-----------( 195      ( 2023 23:55 )             32.1     02-    139  |  103  |  6<L>  ----------------------------<  99  3.8   |  24  |  0.65    Ca    8.8      2023 23:55  Phos  5.0     02-  Mg     1.5     02-    TPro  5.4<L>  /  Alb  2.7<L>  /  TBili  0.3  /  DBili  x   /  AST  42<H>  /  ALT  31  /  AlkPhos  193<H>  02-    PT/INR - ( 2023 23:55 )   PT: 11.7 sec;   INR: 1.02 ratio         PTT - ( 2023 23:55 )  PTT:26.2 sec  Urinalysis Basic - ( 2023 11:42 )    Color: Colorless / Appearance: Clear / S.009 / pH: x  Gluc: x / Ketone: Negative  / Bili: Negative / Urobili: Negative   Blood: x / Protein: Negative / Nitrite: Negative   Leuk Esterase: Negative / RBC: x / WBC x   Sq Epi: x / Non Sq Epi: x / Bacteria: x        --------------------------------------------------------------------------------------    INS AND OUTS:    23 @ 07:01  -  23 @ 07:00  --------------------------------------------------------  IN: 1150 mL / OUT: 2770 mL / NET: -1620 mL    23 @ 07:01  23 @ 01:18  --------------------------------------------------------  IN: 1015 mL / OUT: 3200 mL / NET: -2185 mL        --------------------------------------------------------------------------------------    MEDICATIONS:  MEDICATIONS  (STANDING):  amoxicillin  875 milliGRAM(s)/clavulanate 1 Tablet(s) Oral every 12 hours  benzocaine/menthol Lozenge 1 Lozenge Oral four times a day  chlorhexidine 2% Cloths 1 Application(s) Topical <User Schedule>  enoxaparin Injectable 40 milliGRAM(s) SubCutaneous every 24 hours  melatonin 3 milliGRAM(s) Oral at bedtime  polyethylene glycol 3350 17 Gram(s) Oral daily  senna 2 Tablet(s) Oral at bedtime  simethicone 80 milliGRAM(s) Chew daily    MEDICATIONS  (PRN):    --------------------------------------------------------------------------------------   [facility] Surgery Daily Progress Note  =====================================================    SUBJECTIVE: Recalled for patient with acute severe abdominal pain. Patient reports having diffuse abdominal pain that started suddenly after an episode of vomiting. Patient is on a regular diet and PO medications, but not tolerating well. -/-.    --------------------------------------------------------------------------------------  OBJECTIVE:    VITAL SIGNS:  Vital Signs Last 24 Hrs  T(C): 36.5 (2023 00:12), Max: 37.1 (2023 08:00)  T(F): 97.7 (2023 00:12), Max: 98.8 (2023 08:00)  HR: 81 (2023 00:12) (50 - 81)  BP: 128/70 (2023 00:12) (108/66 - 132/84)  BP(mean): 94 (2023 12:00) (94 - 94)  RR: 18 (2023 00:12) (11 - 26)  SpO2: 96% (2023 00:12) (96% - 100%)    Parameters below as of 2023 00:12  Patient On (Oxygen Delivery Method): room air      --------------------------------------------------------------------------------------    EXAM:  General: Resting in bed, Patient looks unwell after having vomiting episode  Cardiac: Warm and well perfused. Vital signs wnl  Respiratory: Nonlabored respirations  Abdomen: soft, tender to palpation in all 4 quadrants, moderately distended. Midline incision c/d/i  : Murry placed with 1.2L of urine return     --------------------------------------------------------------------------------------    LABS:                        10.7   11.41 )-----------( 195      ( 2023 23:55 )             32.1     02-    139  |  103  |  6<L>  ----------------------------<  99  3.8   |  24  |  0.65    Ca    8.8      2023 23:55  Phos  5.0     02-  Mg     1.5     02-    TPro  5.4<L>  /  Alb  2.7<L>  /  TBili  0.3  /  DBili  x   /  AST  42<H>  /  ALT  31  /  AlkPhos  193<H>  02-    PT/INR - ( 2023 23:55 )   PT: 11.7 sec;   INR: 1.02 ratio         PTT - ( 2023 23:55 )  PTT:26.2 sec  Urinalysis Basic - ( 2023 11:42 )    Color: Colorless / Appearance: Clear / S.009 / pH: x  Gluc: x / Ketone: Negative  / Bili: Negative / Urobili: Negative   Blood: x / Protein: Negative / Nitrite: Negative   Leuk Esterase: Negative / RBC: x / WBC x   Sq Epi: x / Non Sq Epi: x / Bacteria: x        --------------------------------------------------------------------------------------    INS AND OUTS:    23 @ 07:01  -  23 @ 07:00  --------------------------------------------------------  IN: 1150 mL / OUT: 2770 mL / NET: -1620 mL    23 @ 07:01  23 @ 01:18  --------------------------------------------------------  IN: 1015 mL / OUT: 3200 mL / NET: -2185 mL        --------------------------------------------------------------------------------------    MEDICATIONS:  MEDICATIONS  (STANDING):  amoxicillin  875 milliGRAM(s)/clavulanate 1 Tablet(s) Oral every 12 hours  benzocaine/menthol Lozenge 1 Lozenge Oral four times a day  chlorhexidine 2% Cloths 1 Application(s) Topical <User Schedule>  enoxaparin Injectable 40 milliGRAM(s) SubCutaneous every 24 hours  melatonin 3 milliGRAM(s) Oral at bedtime  polyethylene glycol 3350 17 Gram(s) Oral daily  senna 2 Tablet(s) Oral at bedtime  simethicone 80 milliGRAM(s) Chew daily    MEDICATIONS  (PRN):    --------------------------------------------------------------------------------------    RADIOLOGY  CT A/P (prelim)  -Markedly distended bladder  -Uterus is enlarged and there is hypervascularity to the myometrium particularly at the fundus which could be due to postpartum state  -No active bleeding or intraperitoneal hematoma

## 2023-02-07 NOTE — CHART NOTE - NSCHARTNOTEFT_GEN_A_CORE
Pt seen at bedside.  She is doing well after  and takeback for ex-lap/evacuation of hemoperitoneum.  Reviewed the decision for  was based on OB's concern for spread of infection in setting of PROM. Discussed that she would come to our office as an outpatient on Feb 15 at 4 pm for cyst aspiration; definitive management of cyst excision to follow when pt is postpartum.  Pt expressed understanding.  All questions answered.    Tammy Ghosh MD  Urogynecology Fellow, PGY-6

## 2023-02-07 NOTE — PROVIDER CONTACT NOTE (OTHER) - ASSESSMENT
Upon attempt to urinate, pt c/o feeling dizzy and light headed; assisted back to bed MD Azevedo at bedside
VS stable. Pt abdomen appears distended. Abdomen painful to touch. Pt with rigors.

## 2023-02-08 ENCOUNTER — APPOINTMENT (OUTPATIENT)
Dept: OBGYN | Facility: CLINIC | Age: 38
End: 2023-02-08

## 2023-02-08 LAB
ANION GAP SERPL CALC-SCNC: 8 MMOL/L — SIGNIFICANT CHANGE UP (ref 5–17)
BASOPHILS # BLD AUTO: 0.03 K/UL — SIGNIFICANT CHANGE UP (ref 0–0.2)
BASOPHILS NFR BLD AUTO: 0.4 % — SIGNIFICANT CHANGE UP (ref 0–2)
BUN SERPL-MCNC: 6 MG/DL — LOW (ref 7–23)
CALCIUM SERPL-MCNC: 8.1 MG/DL — LOW (ref 8.4–10.5)
CHLORIDE SERPL-SCNC: 105 MMOL/L — SIGNIFICANT CHANGE UP (ref 96–108)
CO2 SERPL-SCNC: 24 MMOL/L — SIGNIFICANT CHANGE UP (ref 22–31)
CREAT SERPL-MCNC: 0.53 MG/DL — SIGNIFICANT CHANGE UP (ref 0.5–1.3)
EGFR: 122 ML/MIN/1.73M2 — SIGNIFICANT CHANGE UP
EOSINOPHIL # BLD AUTO: 0.12 K/UL — SIGNIFICANT CHANGE UP (ref 0–0.5)
EOSINOPHIL NFR BLD AUTO: 1.4 % — SIGNIFICANT CHANGE UP (ref 0–6)
GLUCOSE SERPL-MCNC: 70 MG/DL — SIGNIFICANT CHANGE UP (ref 70–99)
HCT VFR BLD CALC: 30.5 % — LOW (ref 34.5–45)
HGB BLD-MCNC: 10 G/DL — LOW (ref 11.5–15.5)
IMM GRANULOCYTES NFR BLD AUTO: 0.6 % — SIGNIFICANT CHANGE UP (ref 0–0.9)
LYMPHOCYTES # BLD AUTO: 1.67 K/UL — SIGNIFICANT CHANGE UP (ref 1–3.3)
LYMPHOCYTES # BLD AUTO: 19.8 % — SIGNIFICANT CHANGE UP (ref 13–44)
MCHC RBC-ENTMCNC: 28.5 PG — SIGNIFICANT CHANGE UP (ref 27–34)
MCHC RBC-ENTMCNC: 32.8 GM/DL — SIGNIFICANT CHANGE UP (ref 32–36)
MCV RBC AUTO: 86.9 FL — SIGNIFICANT CHANGE UP (ref 80–100)
MONOCYTES # BLD AUTO: 0.62 K/UL — SIGNIFICANT CHANGE UP (ref 0–0.9)
MONOCYTES NFR BLD AUTO: 7.4 % — SIGNIFICANT CHANGE UP (ref 2–14)
NEUTROPHILS # BLD AUTO: 5.93 K/UL — SIGNIFICANT CHANGE UP (ref 1.8–7.4)
NEUTROPHILS NFR BLD AUTO: 70.4 % — SIGNIFICANT CHANGE UP (ref 43–77)
NRBC # BLD: 0 /100 WBCS — SIGNIFICANT CHANGE UP (ref 0–0)
PLATELET # BLD AUTO: 202 K/UL — SIGNIFICANT CHANGE UP (ref 150–400)
POTASSIUM SERPL-MCNC: 3.5 MMOL/L — SIGNIFICANT CHANGE UP (ref 3.5–5.3)
POTASSIUM SERPL-SCNC: 3.5 MMOL/L — SIGNIFICANT CHANGE UP (ref 3.5–5.3)
RBC # BLD: 3.51 M/UL — LOW (ref 3.8–5.2)
RBC # FLD: 15.7 % — HIGH (ref 10.3–14.5)
SODIUM SERPL-SCNC: 137 MMOL/L — SIGNIFICANT CHANGE UP (ref 135–145)
WBC # BLD: 8.42 K/UL — SIGNIFICANT CHANGE UP (ref 3.8–10.5)
WBC # FLD AUTO: 8.42 K/UL — SIGNIFICANT CHANGE UP (ref 3.8–10.5)

## 2023-02-08 RX ORDER — ACETAMINOPHEN 500 MG
1000 TABLET ORAL ONCE
Refills: 0 | Status: COMPLETED | OUTPATIENT
Start: 2023-02-08 | End: 2023-02-08

## 2023-02-08 RX ADMIN — Medication 1000 MILLIGRAM(S): at 22:00

## 2023-02-08 RX ADMIN — Medication 400 MILLIGRAM(S): at 02:33

## 2023-02-08 RX ADMIN — HYDROMORPHONE HYDROCHLORIDE 0.5 MILLIGRAM(S): 2 INJECTION INTRAMUSCULAR; INTRAVENOUS; SUBCUTANEOUS at 18:26

## 2023-02-08 RX ADMIN — Medication 30 MILLIGRAM(S): at 17:45

## 2023-02-08 RX ADMIN — POLYETHYLENE GLYCOL 3350 17 GRAM(S): 17 POWDER, FOR SOLUTION ORAL at 11:48

## 2023-02-08 RX ADMIN — Medication 1 TABLET(S): at 08:35

## 2023-02-08 RX ADMIN — Medication 30 MILLIGRAM(S): at 11:49

## 2023-02-08 RX ADMIN — Medication 400 MILLIGRAM(S): at 14:28

## 2023-02-08 RX ADMIN — Medication 30 MILLIGRAM(S): at 00:43

## 2023-02-08 RX ADMIN — HYDROMORPHONE HYDROCHLORIDE 0.5 MILLIGRAM(S): 2 INJECTION INTRAMUSCULAR; INTRAVENOUS; SUBCUTANEOUS at 08:36

## 2023-02-08 RX ADMIN — HYDROMORPHONE HYDROCHLORIDE 0.5 MILLIGRAM(S): 2 INJECTION INTRAMUSCULAR; INTRAVENOUS; SUBCUTANEOUS at 14:24

## 2023-02-08 RX ADMIN — ENOXAPARIN SODIUM 40 MILLIGRAM(S): 100 INJECTION SUBCUTANEOUS at 11:49

## 2023-02-08 RX ADMIN — Medication 1000 MILLIGRAM(S): at 03:30

## 2023-02-08 RX ADMIN — SIMETHICONE 80 MILLIGRAM(S): 80 TABLET, CHEWABLE ORAL at 11:49

## 2023-02-08 RX ADMIN — HYDROMORPHONE HYDROCHLORIDE 0.5 MILLIGRAM(S): 2 INJECTION INTRAMUSCULAR; INTRAVENOUS; SUBCUTANEOUS at 18:37

## 2023-02-08 RX ADMIN — Medication 1000 MILLIGRAM(S): at 09:10

## 2023-02-08 RX ADMIN — Medication 30 MILLIGRAM(S): at 12:44

## 2023-02-08 RX ADMIN — HYDROMORPHONE HYDROCHLORIDE 0.5 MILLIGRAM(S): 2 INJECTION INTRAMUSCULAR; INTRAVENOUS; SUBCUTANEOUS at 09:10

## 2023-02-08 RX ADMIN — HYDROMORPHONE HYDROCHLORIDE 0.5 MILLIGRAM(S): 2 INJECTION INTRAMUSCULAR; INTRAVENOUS; SUBCUTANEOUS at 01:08

## 2023-02-08 RX ADMIN — SENNA PLUS 2 TABLET(S): 8.6 TABLET ORAL at 21:01

## 2023-02-08 RX ADMIN — Medication 30 MILLIGRAM(S): at 05:20

## 2023-02-08 RX ADMIN — Medication 30 MILLIGRAM(S): at 17:24

## 2023-02-08 RX ADMIN — Medication 1 TABLET(S): at 21:01

## 2023-02-08 RX ADMIN — Medication 30 MILLIGRAM(S): at 23:38

## 2023-02-08 RX ADMIN — Medication 400 MILLIGRAM(S): at 21:02

## 2023-02-08 RX ADMIN — HYDROMORPHONE HYDROCHLORIDE 0.5 MILLIGRAM(S): 2 INJECTION INTRAMUSCULAR; INTRAVENOUS; SUBCUTANEOUS at 15:00

## 2023-02-08 RX ADMIN — HYDROMORPHONE HYDROCHLORIDE 0.5 MILLIGRAM(S): 2 INJECTION INTRAMUSCULAR; INTRAVENOUS; SUBCUTANEOUS at 00:37

## 2023-02-08 RX ADMIN — Medication 1000 MILLIGRAM(S): at 15:00

## 2023-02-08 RX ADMIN — Medication 400 MILLIGRAM(S): at 08:35

## 2023-02-08 NOTE — PROGRESS NOTE ADULT - SUBJECTIVE AND OBJECTIVE BOX
OB Progress Note    S: Patient seen and examined at bedside. Pain well controlled, tolerating regular diet, passing flatus, ambulating without difficulty, voiding spontaneously. Denies heavy vaginal bleeding, N/V, CP/SOB/lightheadedness/dizziness, headache, visual disturbances, epigastric pain.     O:   Vital Signs Last 24 Hrs  T(C): 36.9 (08 Feb 2023 01:07), Max: 36.9 (08 Feb 2023 01:07)  T(F): 98.5 (08 Feb 2023 01:07), Max: 98.5 (08 Feb 2023 01:07)  HR: 62 (08 Feb 2023 01:07) (57 - 67)  BP: 112/73 (08 Feb 2023 01:07) (101/61 - 135/71)  BP(mean): --  RR: 18 (08 Feb 2023 01:07) (18 - 18)  SpO2: 97% (08 Feb 2023 01:07) (97% - 100%)    Parameters below as of 08 Feb 2023 01:07  Patient On (Oxygen Delivery Method): room air        Labs:  Blood type: O Positive  Rubella IgG: RPR: Negative                          10.1<L>   10.67<H> >-----------< 199    ( 02-07 @ 06:32 )             30.8<L>                        10.7<L>   11.41<H> >-----------< 195    ( 02-06 @ 23:55 )             32.1<L>                        9.9<L>   12.63<H> >-----------< 165    ( 02-06 @ 09:13 )             29.5<L>                        10.0<L>   13.44<H> >-----------< 155    ( 02-06 @ 04:55 )             29.0<L>                        10.6<L>   13.12<H> >-----------< 157    ( 02-05 @ 23:39 )             30.6<L>                        10.7<L>   14.07<H> >-----------< 155    ( 02-05 @ 22:32 )             32.2<L>                        8.3<L>   13.14<H> >-----------< 213    ( 02-05 @ 17:27 )             25.9<L>                        10.7<L>   15.56<H> >-----------< 218    ( 02-05 @ 07:00 )             32.5<L>    02-07-23 @ 06:32      139  |  103  |  7   ----------------------------<  93  3.9   |  27  |  0.58    02-06-23 @ 23:55      139  |  103  |  6<L>  ----------------------------<  99  3.8   |  24  |  0.65    02-06-23 @ 04:55      137  |  106  |  6<L>  ----------------------------<  123<H>  4.1   |  21<L>  |  0.48<L>    02-05-23 @ 22:32      136  |  106  |  7   ----------------------------<  124<H>  4.8   |  20<L>  |  0.52    02-05-23 @ 17:27      135  |  102  |  8   ----------------------------<  139<H>  3.6   |  22  |  0.62        Ca    8.4      07 Feb 2023 06:32  Ca    8.8      06 Feb 2023 23:55  Ca    8.2<L>      06 Feb 2023 04:55  Ca    8.2<L>      05 Feb 2023 22:32  Ca    8.6      05 Feb 2023 17:27  Phos  5.0<H>     02-06  Phos  4.2     02-05  Mg     1.5<L>     02-06  Mg     1.6     02-05    TPro  5.0<L>  /  Alb  2.5<L>  /  TBili  0.3  /  DBili  x   /  AST  34  /  ALT  27  /  AlkPhos  163<H>  02-07-23 @ 06:32  TPro  5.4<L>  /  Alb  2.7<L>  /  TBili  0.3  /  DBili  x   /  AST  42<H>  /  ALT  31  /  AlkPhos  193<H>  02-06-23 @ 23:55  TPro  4.5<L>  /  Alb  2.4<L>  /  TBili  0.3  /  DBili  x   /  AST  43<H>  /  ALT  33  /  AlkPhos  181<H>  02-06-23 @ 04:55  TPro  4.7<L>  /  Alb  2.4<L>  /  TBili  0.6  /  DBili  x   /  AST  45<H>  /  ALT  36  /  AlkPhos  199<H>  02-05-23 @ 22:32  TPro  5.4<L>  /  Alb  3.0<L>  /  TBili  0.2  /  DBili  x   /  AST  34  /  ALT  35  /  AlkPhos  249<H>  02-05-23 @ 17:27          PE:  General: NAD  Abdomen: soft, nontender, nondistended, fundus firm  Incision: Pfannensteil incision c/d/i.  : No heavy vaginal bleeding  Extremities: No erythema, no pitting edema   OB Progress Note    S: Patient seen and examined at bedside. Pain well controlled now. Murry in situ. Tolerating clears. Not yet passing flatus. Ambulating without difficulty. Denies heavy vaginal bleeding, N/V, CP/SOB/lightheadedness/dizziness, headache, visual disturbances, epigastric pain.     O:   Vital Signs Last 24 Hrs  T(C): 36.9 (08 Feb 2023 01:07), Max: 36.9 (08 Feb 2023 01:07)  T(F): 98.5 (08 Feb 2023 01:07), Max: 98.5 (08 Feb 2023 01:07)  HR: 62 (08 Feb 2023 01:07) (57 - 67)  BP: 112/73 (08 Feb 2023 01:07) (101/61 - 135/71)  BP(mean): --  RR: 18 (08 Feb 2023 01:07) (18 - 18)  SpO2: 97% (08 Feb 2023 01:07) (97% - 100%)    Parameters below as of 08 Feb 2023 01:07  Patient On (Oxygen Delivery Method): room air        Labs:  Blood type: O Positive  Rubella IgG: RPR: Negative                          10.1<L>   10.67<H> >-----------< 199    ( 02-07 @ 06:32 )             30.8<L>                        10.7<L>   11.41<H> >-----------< 195    ( 02-06 @ 23:55 )             32.1<L>                        9.9<L>   12.63<H> >-----------< 165    ( 02-06 @ 09:13 )             29.5<L>                        10.0<L>   13.44<H> >-----------< 155    ( 02-06 @ 04:55 )             29.0<L>                        10.6<L>   13.12<H> >-----------< 157    ( 02-05 @ 23:39 )             30.6<L>                        10.7<L>   14.07<H> >-----------< 155    ( 02-05 @ 22:32 )             32.2<L>                        8.3<L>   13.14<H> >-----------< 213    ( 02-05 @ 17:27 )             25.9<L>                        10.7<L>   15.56<H> >-----------< 218    ( 02-05 @ 07:00 )             32.5<L>    02-07-23 @ 06:32      139  |  103  |  7   ----------------------------<  93  3.9   |  27  |  0.58    02-06-23 @ 23:55      139  |  103  |  6<L>  ----------------------------<  99  3.8   |  24  |  0.65    02-06-23 @ 04:55      137  |  106  |  6<L>  ----------------------------<  123<H>  4.1   |  21<L>  |  0.48<L>    02-05-23 @ 22:32      136  |  106  |  7   ----------------------------<  124<H>  4.8   |  20<L>  |  0.52    02-05-23 @ 17:27      135  |  102  |  8   ----------------------------<  139<H>  3.6   |  22  |  0.62        Ca    8.4      07 Feb 2023 06:32  Ca    8.8      06 Feb 2023 23:55  Ca    8.2<L>      06 Feb 2023 04:55  Ca    8.2<L>      05 Feb 2023 22:32  Ca    8.6      05 Feb 2023 17:27  Phos  5.0<H>     02-06  Phos  4.2     02-05  Mg     1.5<L>     02-06  Mg     1.6     02-05    TPro  5.0<L>  /  Alb  2.5<L>  /  TBili  0.3  /  DBili  x   /  AST  34  /  ALT  27  /  AlkPhos  163<H>  02-07-23 @ 06:32  TPro  5.4<L>  /  Alb  2.7<L>  /  TBili  0.3  /  DBili  x   /  AST  42<H>  /  ALT  31  /  AlkPhos  193<H>  02-06-23 @ 23:55  TPro  4.5<L>  /  Alb  2.4<L>  /  TBili  0.3  /  DBili  x   /  AST  43<H>  /  ALT  33  /  AlkPhos  181<H>  02-06-23 @ 04:55  TPro  4.7<L>  /  Alb  2.4<L>  /  TBili  0.6  /  DBili  x   /  AST  45<H>  /  ALT  36  /  AlkPhos  199<H>  02-05-23 @ 22:32  TPro  5.4<L>  /  Alb  3.0<L>  /  TBili  0.2  /  DBili  x   /  AST  34  /  ALT  35  /  AlkPhos  249<H>  02-05-23 @ 17:27          PE:  General: NAD  Abdomen: soft, nontender, nondistended, fundus firm  Incision: Pfannensteil incision c/d/i.  : No heavy vaginal bleeding  Extremities: No erythema, no pitting edema

## 2023-02-08 NOTE — PHYSICAL THERAPY INITIAL EVALUATION ADULT - LIVES WITH, PROFILE
[de-identified] : After discussing various treatment options with the patient including but not limited to oral medications, physical therapy, exercise modalities as well as interventional spinal injections, we have decided with the following plan:\par \par - Continue home exercises, stretching, activity modification, physical therapy, and conservative care.\par - Follow-up as needed.\par - Recommend Cyclobenzaprine 10mg BID PRN for muscle spasms and to assist with pain relief.\par - Will order lumbar MRI to rule out HNP. Please let this note serve as a formal request for authorization to perform a MRI of their Lumbar Spine.\par \par  spouse alone/spouse

## 2023-02-08 NOTE — PROGRESS NOTE ADULT - ATTENDING COMMENTS
pt seen and examined. passing flatus now since 6am. tolerating clears. tristan still in place. will d/w urogyn tristan removal. pt worried about removing too early. asking if she can go home and remove at time of urethral cyst drainage.  will f/u recommendations. will advance diet later today if continues to tolerate clears.     charles vazquez

## 2023-02-08 NOTE — PROVIDER CONTACT NOTE (OTHER) - ACTION/TREATMENT ORDERED:
Continue treatment as ordered.
Dr. Lamb assessed at bedside with Dr. Rendon and Dr. Banks. MD assisted patient with RN to CT scan. MD inserted tristan and drained 1200 mL of urine. Surgery team to assess patient.
Morphine 4 Mg IV push as ordered /blood work drawn sent to lab; Rapid response called; see rapid response  team record

## 2023-02-08 NOTE — PHYSICAL THERAPY INITIAL EVALUATION ADULT - ADDITIONAL COMMENTS
lives w/ lives alone in apt, no steps / does not use assistive device, no visual deficits, hearing good, pt is R handed, [place]/ partner in private home 4 steps to enter and flight in home

## 2023-02-08 NOTE — PHYSICAL THERAPY INITIAL EVALUATION ADULT - GENERAL OBSERVATIONS, REHAB EVAL
REc;d in REc;d in bed, R UE IV, abd incision clean dry and intact, + abd binder, NAD, agreeable to PT, IV dilaudid injection prior to PT

## 2023-02-08 NOTE — PHYSICAL THERAPY INITIAL EVALUATION ADULT - PERTINENT HX OF CURRENT PROBLEM, REHAB EVAL
38y/o POD2 from  RTOR due to concern for hemoperitoneum in setting of hypotension, general surgery consulted for further exploration. S/p ex lap no active bleeding found. ACS recalled for increasing abdominal pain in the postoperative period. Currently, vitals and H/H stable. CT without evidence for bleeding or hematoma.

## 2023-02-08 NOTE — PHYSICAL THERAPY INITIAL EVALUATION ADULT - REHAB POTENTIAL, PT EVAL
Patient has appointment for follow-up this Monday the 15th.  Patient needs referral.  Thank you.   Referral done.  Dr. Robles's office will obtain referral from chart.     See referral   none good, to achieve stated therapy goals

## 2023-02-08 NOTE — PROVIDER CONTACT NOTE (OTHER) - REASON
Pt vomiting and complaining of 9/10 abdominal pain
patient with pink tinged urine
Pt  request to see MD for urethral cyst  and OOB ;c/o light headed and dizzy upon second void

## 2023-02-08 NOTE — PROGRESS NOTE ADULT - ASSESSMENT
38yo POD#4 s/p pLTCS for periurethral cyst (ebl 500) and POD#2 from RTOR for suspected abdominal bleed with evacuation of 1L hemoperitoneum with no identified bleed s/p 3U pRBC. Patient with poor pain control on POD#3 with CTAP showing no evidence of intraabdominal bleeding and stable H/H. Pt found to have significant urinary retention with return of approx 1.2L urine with placement of Murry. Since, patient with improved pain and VSS. Will ctm for s/s of ileus.     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

## 2023-02-08 NOTE — PHYSICAL THERAPY INITIAL EVALUATION ADULT - DIAGNOSIS, PT EVAL
decreased strength and functional mobility decreased strength and functional mobility/pain in incision

## 2023-02-09 RX ORDER — ACETAMINOPHEN 500 MG
1000 TABLET ORAL ONCE
Refills: 0 | Status: COMPLETED | OUTPATIENT
Start: 2023-02-09 | End: 2023-02-09

## 2023-02-09 RX ADMIN — Medication 30 MILLIGRAM(S): at 17:53

## 2023-02-09 RX ADMIN — Medication 30 MILLIGRAM(S): at 06:58

## 2023-02-09 RX ADMIN — HYDROMORPHONE HYDROCHLORIDE 0.5 MILLIGRAM(S): 2 INJECTION INTRAMUSCULAR; INTRAVENOUS; SUBCUTANEOUS at 10:32

## 2023-02-09 RX ADMIN — HYDROMORPHONE HYDROCHLORIDE 0.5 MILLIGRAM(S): 2 INJECTION INTRAMUSCULAR; INTRAVENOUS; SUBCUTANEOUS at 22:15

## 2023-02-09 RX ADMIN — Medication 30 MILLIGRAM(S): at 00:30

## 2023-02-09 RX ADMIN — HYDROMORPHONE HYDROCHLORIDE 0.5 MILLIGRAM(S): 2 INJECTION INTRAMUSCULAR; INTRAVENOUS; SUBCUTANEOUS at 21:30

## 2023-02-09 RX ADMIN — Medication 400 MILLIGRAM(S): at 20:45

## 2023-02-09 RX ADMIN — Medication 1000 MILLIGRAM(S): at 21:30

## 2023-02-09 RX ADMIN — Medication 400 MILLIGRAM(S): at 02:51

## 2023-02-09 RX ADMIN — Medication 1000 MILLIGRAM(S): at 03:53

## 2023-02-09 RX ADMIN — Medication 30 MILLIGRAM(S): at 05:40

## 2023-02-09 RX ADMIN — POLYETHYLENE GLYCOL 3350 17 GRAM(S): 17 POWDER, FOR SOLUTION ORAL at 12:10

## 2023-02-09 RX ADMIN — Medication 1 TABLET(S): at 20:45

## 2023-02-09 RX ADMIN — SIMETHICONE 80 MILLIGRAM(S): 80 TABLET, CHEWABLE ORAL at 12:10

## 2023-02-09 RX ADMIN — ENOXAPARIN SODIUM 40 MILLIGRAM(S): 100 INJECTION SUBCUTANEOUS at 10:54

## 2023-02-09 RX ADMIN — SENNA PLUS 2 TABLET(S): 8.6 TABLET ORAL at 21:30

## 2023-02-09 RX ADMIN — Medication 1 TABLET(S): at 09:09

## 2023-02-09 RX ADMIN — Medication 30 MILLIGRAM(S): at 12:10

## 2023-02-09 NOTE — PROGRESS NOTE ADULT - SUBJECTIVE AND OBJECTIVE BOX
OB Progress Note    S: Patient seen and examined at bedside. Pain well controlled, tolerating regular diet, passing flatus, ambulating without difficulty, voiding spontaneously. Denies heavy vaginal bleeding, N/V, CP/SOB/lightheadedness/dizziness, headache, visual disturbances, epigastric pain.     O:   Vital Signs Last 24 Hrs  T(C): 36.6 (09 Feb 2023 00:39), Max: 37 (08 Feb 2023 18:25)  T(F): 97.9 (09 Feb 2023 00:39), Max: 98.6 (08 Feb 2023 18:25)  HR: 65 (09 Feb 2023 00:39) (61 - 75)  BP: 110/73 (09 Feb 2023 00:39) (97/61 - 117/61)  BP(mean): --  RR: 18 (09 Feb 2023 00:39) (15 - 18)  SpO2: 98% (09 Feb 2023 00:39) (96% - 99%)    Parameters below as of 09 Feb 2023 00:39  Patient On (Oxygen Delivery Method): room air        Labs:  Blood type: O Positive  Rubella IgG: RPR: Negative                          10.0<L>   8.42 >-----------< 202    ( 02-08 @ 06:13 )             30.5<L>                        10.1<L>   10.67<H> >-----------< 199    ( 02-07 @ 06:32 )             30.8<L>                        10.7<L>   11.41<H> >-----------< 195    ( 02-06 @ 23:55 )             32.1<L>                        9.9<L>   12.63<H> >-----------< 165    ( 02-06 @ 09:13 )             29.5<L>                        10.0<L>   13.44<H> >-----------< 155    ( 02-06 @ 04:55 )             29.0<L>    02-08-23 @ 06:13      137  |  105  |  6<L>  ----------------------------<  70  3.5   |  24  |  0.53    02-07-23 @ 06:32      139  |  103  |  7   ----------------------------<  93  3.9   |  27  |  0.58    02-06-23 @ 23:55      139  |  103  |  6<L>  ----------------------------<  99  3.8   |  24  |  0.65    02-06-23 @ 04:55      137  |  106  |  6<L>  ----------------------------<  123<H>  4.1   |  21<L>  |  0.48<L>        Ca    8.1<L>      08 Feb 2023 06:13  Ca    8.4      07 Feb 2023 06:32  Ca    8.8      06 Feb 2023 23:55  Ca    8.2<L>      06 Feb 2023 04:55  Phos  5.0<H>     02-06  Mg     1.5<L>     02-06    TPro  5.0<L>  /  Alb  2.5<L>  /  TBili  0.3  /  DBili  x   /  AST  34  /  ALT  27  /  AlkPhos  163<H>  02-07-23 @ 06:32  TPro  5.4<L>  /  Alb  2.7<L>  /  TBili  0.3  /  DBili  x   /  AST  42<H>  /  ALT  31  /  AlkPhos  193<H>  02-06-23 @ 23:55  TPro  4.5<L>  /  Alb  2.4<L>  /  TBili  0.3  /  DBili  x   /  AST  43<H>  /  ALT  33  /  AlkPhos  181<H>  02-06-23 @ 04:55          PE:  General: NAD  Abdomen: soft, nontender, nondistended, fundus firm  Incision: Pfannensteil incision c/d/i.  : No heavy vaginal bleeding  Extremities: No erythema, no pitting edema   OB Progress Note    S: Patient seen and examined at bedside. Still with abdominal pain, unchanged. Tolerating regular diet, passing flatus, ambulating without difficulty, voiding spontaneously. Denies heavy vaginal bleeding, N/V, CP/SOB/lightheadedness/dizziness, headache, visual disturbances, epigastric pain.     O:   Vital Signs Last 24 Hrs  T(C): 36.6 (09 Feb 2023 00:39), Max: 37 (08 Feb 2023 18:25)  T(F): 97.9 (09 Feb 2023 00:39), Max: 98.6 (08 Feb 2023 18:25)  HR: 65 (09 Feb 2023 00:39) (61 - 75)  BP: 110/73 (09 Feb 2023 00:39) (97/61 - 117/61)  BP(mean): --  RR: 18 (09 Feb 2023 00:39) (15 - 18)  SpO2: 98% (09 Feb 2023 00:39) (96% - 99%)    Parameters below as of 09 Feb 2023 00:39  Patient On (Oxygen Delivery Method): room air        Labs:  Blood type: O Positive  Rubella IgG: RPR: Negative                          10.0<L>   8.42 >-----------< 202    ( 02-08 @ 06:13 )             30.5<L>                        10.1<L>   10.67<H> >-----------< 199    ( 02-07 @ 06:32 )             30.8<L>                        10.7<L>   11.41<H> >-----------< 195    ( 02-06 @ 23:55 )             32.1<L>                        9.9<L>   12.63<H> >-----------< 165    ( 02-06 @ 09:13 )             29.5<L>                        10.0<L>   13.44<H> >-----------< 155    ( 02-06 @ 04:55 )             29.0<L>    02-08-23 @ 06:13      137  |  105  |  6<L>  ----------------------------<  70  3.5   |  24  |  0.53    02-07-23 @ 06:32      139  |  103  |  7   ----------------------------<  93  3.9   |  27  |  0.58    02-06-23 @ 23:55      139  |  103  |  6<L>  ----------------------------<  99  3.8   |  24  |  0.65    02-06-23 @ 04:55      137  |  106  |  6<L>  ----------------------------<  123<H>  4.1   |  21<L>  |  0.48<L>        Ca    8.1<L>      08 Feb 2023 06:13  Ca    8.4      07 Feb 2023 06:32  Ca    8.8      06 Feb 2023 23:55  Ca    8.2<L>      06 Feb 2023 04:55  Phos  5.0<H>     02-06  Mg     1.5<L>     02-06    TPro  5.0<L>  /  Alb  2.5<L>  /  TBili  0.3  /  DBili  x   /  AST  34  /  ALT  27  /  AlkPhos  163<H>  02-07-23 @ 06:32  TPro  5.4<L>  /  Alb  2.7<L>  /  TBili  0.3  /  DBili  x   /  AST  42<H>  /  ALT  31  /  AlkPhos  193<H>  02-06-23 @ 23:55  TPro  4.5<L>  /  Alb  2.4<L>  /  TBili  0.3  /  DBili  x   /  AST  43<H>  /  ALT  33  /  AlkPhos  181<H>  02-06-23 @ 04:55          Physical Exam:  General: NAD  Abdomen: Soft, mildly distended, mild tenderness in RUQ and RLQ carl-incisional, fundus firm  Incision: Pfannensteil and midline incision CDI, subcuticular suture closure  Pelvic: Lochia wnl

## 2023-02-09 NOTE — PROGRESS NOTE ADULT - ASSESSMENT
38yo POD#5 s/p pLTCS for periurethral cyst (ebl 500) and POD#2 from RTOR for suspected abdominal bleed with evacuation of 1L hemoperitoneum with no identified bleed s/p 3U pRBC. Patient with poor pain control on POD#3 with CTAP showing no evidence of intraabdominal bleeding and stable H/H. Pt found to have significant urinary retention with return of approx 1.2L urine with placement of Murry. Since, patient with improved pain and VSS.     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

## 2023-02-10 RX ORDER — ACETAMINOPHEN 500 MG
975 TABLET ORAL EVERY 6 HOURS
Refills: 0 | Status: DISCONTINUED | OUTPATIENT
Start: 2023-02-10 | End: 2023-02-11

## 2023-02-10 RX ORDER — OXYCODONE HYDROCHLORIDE 5 MG/1
5 TABLET ORAL EVERY 4 HOURS
Refills: 0 | Status: DISCONTINUED | OUTPATIENT
Start: 2023-02-10 | End: 2023-02-11

## 2023-02-10 RX ORDER — HYDROMORPHONE HYDROCHLORIDE 2 MG/ML
1 INJECTION INTRAMUSCULAR; INTRAVENOUS; SUBCUTANEOUS EVERY 6 HOURS
Refills: 0 | Status: DISCONTINUED | OUTPATIENT
Start: 2023-02-10 | End: 2023-02-10

## 2023-02-10 RX ORDER — ACETAMINOPHEN 500 MG
1000 TABLET ORAL ONCE
Refills: 0 | Status: COMPLETED | OUTPATIENT
Start: 2023-02-10 | End: 2023-02-10

## 2023-02-10 RX ORDER — IBUPROFEN 200 MG
600 TABLET ORAL EVERY 6 HOURS
Refills: 0 | Status: DISCONTINUED | OUTPATIENT
Start: 2023-02-10 | End: 2023-02-11

## 2023-02-10 RX ORDER — OXYCODONE HYDROCHLORIDE 5 MG/1
10 TABLET ORAL EVERY 6 HOURS
Refills: 0 | Status: DISCONTINUED | OUTPATIENT
Start: 2023-02-10 | End: 2023-02-11

## 2023-02-10 RX ADMIN — Medication 30 MILLIGRAM(S): at 01:18

## 2023-02-10 RX ADMIN — Medication 400 MILLIGRAM(S): at 09:34

## 2023-02-10 RX ADMIN — Medication 1000 MILLIGRAM(S): at 03:20

## 2023-02-10 RX ADMIN — Medication 1 TABLET(S): at 08:36

## 2023-02-10 RX ADMIN — Medication 975 MILLIGRAM(S): at 17:06

## 2023-02-10 RX ADMIN — Medication 1 TABLET(S): at 20:39

## 2023-02-10 RX ADMIN — Medication 600 MILLIGRAM(S): at 16:08

## 2023-02-10 RX ADMIN — POLYETHYLENE GLYCOL 3350 17 GRAM(S): 17 POWDER, FOR SOLUTION ORAL at 12:01

## 2023-02-10 RX ADMIN — Medication 30 MILLIGRAM(S): at 06:46

## 2023-02-10 RX ADMIN — Medication 1000 MILLIGRAM(S): at 10:13

## 2023-02-10 RX ADMIN — Medication 975 MILLIGRAM(S): at 16:08

## 2023-02-10 RX ADMIN — OXYCODONE HYDROCHLORIDE 10 MILLIGRAM(S): 5 TABLET ORAL at 20:39

## 2023-02-10 RX ADMIN — Medication 30 MILLIGRAM(S): at 11:56

## 2023-02-10 RX ADMIN — Medication 400 MILLIGRAM(S): at 02:49

## 2023-02-10 RX ADMIN — Medication 30 MILLIGRAM(S): at 12:30

## 2023-02-10 RX ADMIN — OXYCODONE HYDROCHLORIDE 10 MILLIGRAM(S): 5 TABLET ORAL at 21:30

## 2023-02-10 RX ADMIN — Medication 30 MILLIGRAM(S): at 00:11

## 2023-02-10 RX ADMIN — Medication 975 MILLIGRAM(S): at 23:25

## 2023-02-10 RX ADMIN — Medication 600 MILLIGRAM(S): at 23:25

## 2023-02-10 RX ADMIN — Medication 30 MILLIGRAM(S): at 05:56

## 2023-02-10 RX ADMIN — SENNA PLUS 2 TABLET(S): 8.6 TABLET ORAL at 21:55

## 2023-02-10 RX ADMIN — ENOXAPARIN SODIUM 40 MILLIGRAM(S): 100 INJECTION SUBCUTANEOUS at 11:56

## 2023-02-10 RX ADMIN — Medication 600 MILLIGRAM(S): at 17:08

## 2023-02-10 NOTE — PROGRESS NOTE ADULT - ASSESSMENT
36yo POD#6 s/p pLTCS for periurethral cyst (ebl 500) and POD#2 from RTOR for suspected abdominal bleed with evacuation of 1L hemoperitoneum with no identified bleed s/p 3U pRBC. Patient with poor pain control on POD#3 with CTAP showing no evidence of intraabdominal bleeding and stable H/H. Pt found to have significant urinary retention with return of approx 1.2L urine with placement of Murry. Since, patient with improved pain and VSS. Persistent carl-incisional pain possibly 2/2 hematoma vs seroma.     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

## 2023-02-10 NOTE — PROGRESS NOTE ADULT - SUBJECTIVE AND OBJECTIVE BOX
OB Progress Note    S: Patient seen and examined at bedside. Patient still with pain, specifically in at right bilateral inferior aspect of incision. Tolerating regular diet, passing flatus, ambulating without difficulty. Murry in situ. Denies heavy vaginal bleeding, N/V, CP/SOB/lightheadedness/dizziness, headache, visual disturbances, epigastric pain.     O:   Vital Signs Last 24 Hrs  T(C): 36.8 (10 Feb 2023 00:45), Max: 37 (09 Feb 2023 17:00)  T(F): 98.3 (10 Feb 2023 00:45), Max: 98.6 (09 Feb 2023 17:00)  HR: 59 (10 Feb 2023 00:45) (58 - 636)  BP: 117/72 (10 Feb 2023 00:45) (98/65 - 131/79)  BP(mean): --  RR: 18 (10 Feb 2023 00:45) (18 - 20)  SpO2: 98% (10 Feb 2023 00:45) (96% - 98%)    Parameters below as of 10 Feb 2023 00:45  Patient On (Oxygen Delivery Method): room air        Labs:  Blood type: O Positive  Rubella IgG: RPR: Negative                          10.0<L>   8.42 >-----------< 202    ( 02-08 @ 06:13 )             30.5<L>                        10.1<L>   10.67<H> >-----------< 199    ( 02-07 @ 06:32 )             30.8<L>    02-08-23 @ 06:13      137  |  105  |  6<L>  ----------------------------<  70  3.5   |  24  |  0.53    02-07-23 @ 06:32      139  |  103  |  7   ----------------------------<  93  3.9   |  27  |  0.58        Ca    8.1<L>      08 Feb 2023 06:13  Ca    8.4      07 Feb 2023 06:32    TPro  5.0<L>  /  Alb  2.5<L>  /  TBili  0.3  /  DBili  x   /  AST  34  /  ALT  27  /  AlkPhos  163<H>  02-07-23 @ 06:32          Physical Exam:  General: NAD  Abdomen: Soft, mildly distended, mild tenderness in RUQ and RLQ carl-incisional, fundus firm  Incision: Pfannensteil and midline incision CDI, subcuticular suture closure. Area of induration at right lateral inferior epigastric c/w hematoma or seroma   Pelvic: Lochia wnl

## 2023-02-11 ENCOUNTER — TRANSCRIPTION ENCOUNTER (OUTPATIENT)
Age: 38
End: 2023-02-11

## 2023-02-11 VITALS
RESPIRATION RATE: 18 BRPM | TEMPERATURE: 98 F | OXYGEN SATURATION: 99 % | SYSTOLIC BLOOD PRESSURE: 109 MMHG | DIASTOLIC BLOOD PRESSURE: 75 MMHG | HEART RATE: 60 BPM

## 2023-02-11 PROCEDURE — 86769 SARS-COV-2 COVID-19 ANTIBODY: CPT

## 2023-02-11 PROCEDURE — 74177 CT ABD & PELVIS W/CONTRAST: CPT

## 2023-02-11 PROCEDURE — 84295 ASSAY OF SERUM SODIUM: CPT

## 2023-02-11 PROCEDURE — 85610 PROTHROMBIN TIME: CPT

## 2023-02-11 PROCEDURE — 82962 GLUCOSE BLOOD TEST: CPT

## 2023-02-11 PROCEDURE — 85025 COMPLETE CBC W/AUTO DIFF WBC: CPT

## 2023-02-11 PROCEDURE — C9399: CPT

## 2023-02-11 PROCEDURE — 84100 ASSAY OF PHOSPHORUS: CPT

## 2023-02-11 PROCEDURE — 85014 HEMATOCRIT: CPT

## 2023-02-11 PROCEDURE — 86923 COMPATIBILITY TEST ELECTRIC: CPT

## 2023-02-11 PROCEDURE — 97161 PT EVAL LOW COMPLEX 20 MIN: CPT

## 2023-02-11 PROCEDURE — 87635 SARS-COV-2 COVID-19 AMP PRB: CPT

## 2023-02-11 PROCEDURE — 59025 FETAL NON-STRESS TEST: CPT

## 2023-02-11 PROCEDURE — 82803 BLOOD GASES ANY COMBINATION: CPT

## 2023-02-11 PROCEDURE — 86901 BLOOD TYPING SEROLOGIC RH(D): CPT

## 2023-02-11 PROCEDURE — C1769: CPT

## 2023-02-11 PROCEDURE — 97116 GAIT TRAINING THERAPY: CPT

## 2023-02-11 PROCEDURE — 85384 FIBRINOGEN ACTIVITY: CPT

## 2023-02-11 PROCEDURE — 82435 ASSAY OF BLOOD CHLORIDE: CPT

## 2023-02-11 PROCEDURE — 59050 FETAL MONITOR W/REPORT: CPT

## 2023-02-11 PROCEDURE — 80048 BASIC METABOLIC PNL TOTAL CA: CPT

## 2023-02-11 PROCEDURE — 36415 COLL VENOUS BLD VENIPUNCTURE: CPT

## 2023-02-11 PROCEDURE — 84132 ASSAY OF SERUM POTASSIUM: CPT

## 2023-02-11 PROCEDURE — 86900 BLOOD TYPING SEROLOGIC ABO: CPT

## 2023-02-11 PROCEDURE — 85396 CLOTTING ASSAY WHOLE BLOOD: CPT

## 2023-02-11 PROCEDURE — 85018 HEMOGLOBIN: CPT

## 2023-02-11 PROCEDURE — 97110 THERAPEUTIC EXERCISES: CPT

## 2023-02-11 PROCEDURE — 83605 ASSAY OF LACTIC ACID: CPT

## 2023-02-11 PROCEDURE — 85730 THROMBOPLASTIN TIME PARTIAL: CPT

## 2023-02-11 PROCEDURE — 86850 RBC ANTIBODY SCREEN: CPT

## 2023-02-11 PROCEDURE — 85027 COMPLETE CBC AUTOMATED: CPT

## 2023-02-11 PROCEDURE — 82947 ASSAY GLUCOSE BLOOD QUANT: CPT

## 2023-02-11 PROCEDURE — 82330 ASSAY OF CALCIUM: CPT

## 2023-02-11 PROCEDURE — 83735 ASSAY OF MAGNESIUM: CPT

## 2023-02-11 PROCEDURE — 82565 ASSAY OF CREATININE: CPT

## 2023-02-11 PROCEDURE — P9016: CPT

## 2023-02-11 PROCEDURE — 81003 URINALYSIS AUTO W/O SCOPE: CPT

## 2023-02-11 PROCEDURE — 86780 TREPONEMA PALLIDUM: CPT

## 2023-02-11 PROCEDURE — 80053 COMPREHEN METABOLIC PANEL: CPT

## 2023-02-11 RX ORDER — OXYCODONE HYDROCHLORIDE 5 MG/1
1 TABLET ORAL
Qty: 15 | Refills: 0
Start: 2023-02-11 | End: 2023-02-15

## 2023-02-11 RX ORDER — IBUPROFEN 200 MG
1 TABLET ORAL
Qty: 0 | Refills: 0 | DISCHARGE
Start: 2023-02-11

## 2023-02-11 RX ADMIN — Medication 600 MILLIGRAM(S): at 05:30

## 2023-02-11 RX ADMIN — Medication 975 MILLIGRAM(S): at 11:36

## 2023-02-11 RX ADMIN — OXYCODONE HYDROCHLORIDE 10 MILLIGRAM(S): 5 TABLET ORAL at 02:55

## 2023-02-11 RX ADMIN — POLYETHYLENE GLYCOL 3350 17 GRAM(S): 17 POWDER, FOR SOLUTION ORAL at 11:38

## 2023-02-11 RX ADMIN — Medication 1 TABLET(S): at 09:34

## 2023-02-11 RX ADMIN — Medication 975 MILLIGRAM(S): at 05:30

## 2023-02-11 RX ADMIN — OXYCODONE HYDROCHLORIDE 10 MILLIGRAM(S): 5 TABLET ORAL at 09:35

## 2023-02-11 RX ADMIN — Medication 600 MILLIGRAM(S): at 11:36

## 2023-02-11 RX ADMIN — Medication 600 MILLIGRAM(S): at 00:25

## 2023-02-11 RX ADMIN — OXYCODONE HYDROCHLORIDE 10 MILLIGRAM(S): 5 TABLET ORAL at 03:50

## 2023-02-11 RX ADMIN — Medication 975 MILLIGRAM(S): at 00:25

## 2023-02-11 RX ADMIN — ENOXAPARIN SODIUM 40 MILLIGRAM(S): 100 INJECTION SUBCUTANEOUS at 11:38

## 2023-02-11 RX ADMIN — SIMETHICONE 80 MILLIGRAM(S): 80 TABLET, CHEWABLE ORAL at 11:38

## 2023-02-11 NOTE — DISCHARGE NOTE OB - NS MD DC FALL RISK RISK
For information on Fall & Injury Prevention, visit: [email or web address] OR  [email or web address] OR  https://www.cdc.gov/steadi/patient.html

## 2023-02-11 NOTE — DISCHARGE NOTE OB - CARE PLAN
Principal Discharge DX:	 delivery delivered  Assessment and plan of treatment:	Follow up in the office in 2 weeks. Call to schedule an appointment.  Secondary Diagnosis:	Anemia due to acute blood loss  Assessment and plan of treatment:	Continue iron and vitamin C each day.  Secondary Diagnosis:	Urethral gland abscess  Assessment and plan of treatment:	Follow up with Dr. Kwan. Call to schedule an appointment. Continue Augmentin until outpatient follow up with Dr. Kwan.   1

## 2023-02-11 NOTE — DISCHARGE NOTE OB - PATIENT PORTAL LINK FT
You can access the FollowMyHealth Patient Portal offered by [facility] by registering at the following website: [email or web address]. By joining [facility]’s FollowMyHealth portal, you will also be able to view your health information using other applications (apps) compatible with our system.

## 2023-02-11 NOTE — DISCHARGE NOTE OB - HOSPITAL COURSE
PT admitted at 39 weeks with PROM. Pt had  delivery due to urethral abscess. Postoperatively she was reoperated, x-lap abdominal washout for hemoperitoneum. PT got 3 Units PRBCs. She was admitted to the SICU. She had a urinary retention and a Murry was replaced. On postoperative day 7 pt met milestones for discharge home. PT passed trial of void.

## 2023-02-11 NOTE — DISCHARGE NOTE OB - MEDICATION SUMMARY - MEDICATIONS TO TAKE
I will START or STAY ON the medications listed below when I get home from the hospital:    oxyCODONE 5 mg oral tablet  -- 1 tab(s) by mouth 3 times a day MDD:3 tablets  -- Caution federal law prohibits the transfer of this drug to any person other  than the person for whom it was prescribed.  It is very important that you take or use this exactly as directed.  Do not skip doses or discontinue unless directed by your doctor.  May cause drowsiness or dizziness.  This prescription cannot be refilled.  Using more of this medication than prescribed may cause serious breathing problems.    -- Indication: For pain    ibuprofen 600 mg oral tablet  -- 1 tab(s) by mouth every 6 hours  -- Indication: For pain    Prenatal Multivitamins oral tablet (obsolete)  -- Indication: For Nutritional supplement

## 2023-02-11 NOTE — DISCHARGE NOTE OB - CARE PROVIDER_API CALL
Nela Ruiz (DO)  Obstetrics and Gynecology  [address] [place] [address], [address]  [place] [address]  Phone: [phone]  Fax: [phone]  Follow Up Time:

## 2023-02-11 NOTE — PROGRESS NOTE ADULT - SUBJECTIVE AND OBJECTIVE BOX
OB Progress Note    S: Patient seen and examined at bedside. Pain well controlled, tolerating regular diet, passing flatus, ambulating without difficulty, voiding spontaneously. Denies heavy vaginal bleeding, N/V, CP/SOB/lightheadedness/dizziness, headache, visual disturbances, epigastric pain.     O:   Vital Signs Last 24 Hrs  T(C): 36.6 (11 Feb 2023 00:43), Max: 36.9 (10 Feb 2023 05:17)  T(F): 97.9 (11 Feb 2023 00:43), Max: 98.4 (10 Feb 2023 05:17)  HR: 75 (11 Feb 2023 00:43) (56 - 75)  BP: 119/79 (11 Feb 2023 00:43) (109/64 - 128/79)  BP(mean): --  RR: 18 (11 Feb 2023 00:43) (18 - 18)  SpO2: 98% (11 Feb 2023 00:43) (97% - 98%)    Parameters below as of 11 Feb 2023 00:43  Patient On (Oxygen Delivery Method): room air        Labs:  Blood type: O Positive  Rubella IgG: RPR: Negative                          10.0<L>   8.42 >-----------< 202    ( 02-08 @ 06:13 )             30.5<L>    02-08-23 @ 06:13      137  |  105  |  6<L>  ----------------------------<  70  3.5   |  24  |  0.53        Ca    8.1<L>      08 Feb 2023 06:13        Physical Exam:  General: NAD  Abdomen: Soft, mildly distended, mild tenderness in RUQ and RLQ carl-incisional, fundus firm  Incision: Pfannensteil and midline incision CDI, subcuticular suture closure. Area of induration at right lateral inferior epigastric c/w hematoma or seroma   Pelvic: Lochia wnl

## 2023-02-11 NOTE — PROGRESS NOTE ADULT - ASSESSMENT
36yo POD#7 s/p pLTCS for periurethral cyst (ebl 500) and POD#2 from RTOR for suspected abdominal bleed with evacuation of 1L hemoperitoneum with no identified bleed s/p 3U pRBC. Patient with poor pain control on POD#3 with CTAP showing no evidence of intraabdominal bleeding and stable H/H. Pt found to have significant urinary retention with return of approx 1.2L urine with placement of Murry. Since, patient with improved pain and VSS.     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

## 2023-02-11 NOTE — DISCHARGE NOTE OB - PLAN OF CARE
Continue iron and vitamin C each day. Follow up with Dr. Kwan. Call to schedule an appointment. Continue Augmentin until outpatient follow up with Dr. Kwan. Follow up in the office in 2 weeks. Call to schedule an appointment.

## 2023-02-11 NOTE — CHART NOTE - NSCHARTNOTEFT_GEN_A_CORE
R3 Chart note    Patient seen at bedside. 300cc sterile saline instilled into bladder. Patient escorted to bathroom and voided 275cc into hat within minutes with relief. Patient denies dysuria or complaints. Passed trial of void.     Sharmin Kunz PGY3

## 2023-02-14 ENCOUNTER — NON-APPOINTMENT (OUTPATIENT)
Age: 38
End: 2023-02-14

## 2023-02-14 RX ORDER — OXYCODONE 5 MG/1
5 TABLET ORAL
Qty: 12 | Refills: 0 | Status: ACTIVE | COMMUNITY
Start: 2023-02-14 | End: 1900-01-01

## 2023-02-15 ENCOUNTER — NON-APPOINTMENT (OUTPATIENT)
Age: 38
End: 2023-02-15

## 2023-02-15 ENCOUNTER — APPOINTMENT (OUTPATIENT)
Dept: UROGYNECOLOGY | Facility: CLINIC | Age: 38
End: 2023-02-15
Payer: COMMERCIAL

## 2023-02-15 PROCEDURE — 99214 OFFICE O/P EST MOD 30 MIN: CPT

## 2023-02-15 NOTE — HISTORY OF PRESENT ILLNESS
[FreeTextEntry1] : Aleyda  presents for follow up on urethral diverticulum/abscess. She recently underwent c/s followed by emergency ELAP for hemorrhage. She reports diverticulum  decreased in size since hospitalization, denies pain at site. She desires surgical excision.

## 2023-02-15 NOTE — DISCUSSION/SUMMARY
[FreeTextEntry1] : \par Urethral diverticulum, h/o abscess\par -Complete abx course\par -Will schedule urethral diverticulectomy 3/30\par

## 2023-02-15 NOTE — PHYSICAL EXAM
[Exam Deferred] : was deferred [FreeTextEntry1] : General: Well, appearing. Alert and orientated. No acute distress\par \par  [Normal] : normal external genitalia [Labia Majora] : were normal [FreeTextEntry4] : 2 cm periurethral cyst, nontender,  purulent discharge per urethra with cyst compression. I&D performed, 2cc extracted.

## 2023-02-17 ENCOUNTER — NON-APPOINTMENT (OUTPATIENT)
Age: 38
End: 2023-02-17

## 2023-02-24 ENCOUNTER — NON-APPOINTMENT (OUTPATIENT)
Age: 38
End: 2023-02-24

## 2023-03-02 ENCOUNTER — APPOINTMENT (OUTPATIENT)
Dept: OBGYN | Facility: CLINIC | Age: 38
End: 2023-03-02
Payer: COMMERCIAL

## 2023-03-02 VITALS
HEIGHT: 61 IN | BODY MASS INDEX: 20.77 KG/M2 | DIASTOLIC BLOOD PRESSURE: 68 MMHG | WEIGHT: 110 LBS | SYSTOLIC BLOOD PRESSURE: 108 MMHG

## 2023-03-02 PROCEDURE — 0503F POSTPARTUM CARE VISIT: CPT

## 2023-03-02 NOTE — HISTORY OF PRESENT ILLNESS
[Postpartum Follow Up] : postpartum follow up [Complications:___] : complications include: [unfilled] [Delivery Date: ___] : on [unfilled] [Primary C/S] : delivered by  section [Male] : Delivery History: baby boy [Wt. ___] : weighing [unfilled] [Breastfeeding] : currently nursing [None] : No associated symptoms are reported [Clean/Dry/Intact] : clean, dry and intact [Doing Well] : is doing well [No Sign of Infection] : is showing no signs of infection [Excellent Pain Control] : has excellent pain control [Not Done] : Examination of breasts not done [Erythema] : not erythematous [Swelling] : not swollen [Dehiscence] : not dehisced [FreeTextEntry8] : 38 y/o presents for PPV s/p c/s on 2/4/23 s/p exlap 2/4/23 [de-identified] : delivered by Dr. Ruiz [de-identified] : Currently bottle feeding with very little breast. . Bleeding is now gone. She has been managing pain with minimal medication.  [de-identified] : Local care.  RTO in 2 weeks

## 2023-03-02 NOTE — END OF VISIT
[FreeTextEntry2] : I, Raiza Duran , acted as a scribe on behalf of Dr. Nela Ruiz on 03/02/2023 .\par \par All medical entries made by the scribe were at my, Dr. Nela Ruiz, direction and personally dictated by me on 03/02/2023  I have reviewed the chart and agree that the record accurately reflects my personal performance of the history, physical exam, assessment and plan. I have also personally directed, reviewed, and agreed with the chart.\par

## 2023-03-14 ENCOUNTER — APPOINTMENT (OUTPATIENT)
Dept: UROGYNECOLOGY | Facility: CLINIC | Age: 38
End: 2023-03-14
Payer: COMMERCIAL

## 2023-03-14 ENCOUNTER — OUTPATIENT (OUTPATIENT)
Dept: OUTPATIENT SERVICES | Facility: HOSPITAL | Age: 38
LOS: 1 days | End: 2023-03-14
Payer: COMMERCIAL

## 2023-03-14 VITALS
OXYGEN SATURATION: 98 % | TEMPERATURE: 98 F | DIASTOLIC BLOOD PRESSURE: 60 MMHG | HEART RATE: 62 BPM | WEIGHT: 110.01 LBS | RESPIRATION RATE: 16 BRPM | SYSTOLIC BLOOD PRESSURE: 95 MMHG | HEIGHT: 61 IN

## 2023-03-14 DIAGNOSIS — N36.1 URETHRAL DIVERTICULUM: ICD-10-CM

## 2023-03-14 DIAGNOSIS — Z98.891 HISTORY OF UTERINE SCAR FROM PREVIOUS SURGERY: ICD-10-CM

## 2023-03-14 DIAGNOSIS — Z98.890 OTHER SPECIFIED POSTPROCEDURAL STATES: Chronic | ICD-10-CM

## 2023-03-14 DIAGNOSIS — Z98.890 OTHER SPECIFIED POSTPROCEDURAL STATES: ICD-10-CM

## 2023-03-14 DIAGNOSIS — Z98.891 HISTORY OF UTERINE SCAR FROM PREVIOUS SURGERY: Chronic | ICD-10-CM

## 2023-03-14 DIAGNOSIS — Z01.818 ENCOUNTER FOR OTHER PREPROCEDURAL EXAMINATION: ICD-10-CM

## 2023-03-14 DIAGNOSIS — N34.0 URETHRAL ABSCESS: ICD-10-CM

## 2023-03-14 LAB
ANION GAP SERPL CALC-SCNC: 11 MMOL/L — SIGNIFICANT CHANGE UP (ref 5–17)
BUN SERPL-MCNC: 12 MG/DL — SIGNIFICANT CHANGE UP (ref 7–23)
CALCIUM SERPL-MCNC: 9.6 MG/DL — SIGNIFICANT CHANGE UP (ref 8.4–10.5)
CHLORIDE SERPL-SCNC: 105 MMOL/L — SIGNIFICANT CHANGE UP (ref 96–108)
CO2 SERPL-SCNC: 24 MMOL/L — SIGNIFICANT CHANGE UP (ref 22–31)
CREAT SERPL-MCNC: 0.57 MG/DL — SIGNIFICANT CHANGE UP (ref 0.5–1.3)
EGFR: 120 ML/MIN/1.73M2 — SIGNIFICANT CHANGE UP
GLUCOSE SERPL-MCNC: 83 MG/DL — SIGNIFICANT CHANGE UP (ref 70–99)
HCT VFR BLD CALC: 37.6 % — SIGNIFICANT CHANGE UP (ref 34.5–45)
HGB BLD-MCNC: 12 G/DL — SIGNIFICANT CHANGE UP (ref 11.5–15.5)
MCHC RBC-ENTMCNC: 27.7 PG — SIGNIFICANT CHANGE UP (ref 27–34)
MCHC RBC-ENTMCNC: 31.9 GM/DL — LOW (ref 32–36)
MCV RBC AUTO: 86.8 FL — SIGNIFICANT CHANGE UP (ref 80–100)
NRBC # BLD: 0 /100 WBCS — SIGNIFICANT CHANGE UP (ref 0–0)
PLATELET # BLD AUTO: 255 K/UL — SIGNIFICANT CHANGE UP (ref 150–400)
POTASSIUM SERPL-MCNC: 3.9 MMOL/L — SIGNIFICANT CHANGE UP (ref 3.5–5.3)
POTASSIUM SERPL-SCNC: 3.9 MMOL/L — SIGNIFICANT CHANGE UP (ref 3.5–5.3)
RBC # BLD: 4.33 M/UL — SIGNIFICANT CHANGE UP (ref 3.8–5.2)
RBC # FLD: 15.6 % — HIGH (ref 10.3–14.5)
SODIUM SERPL-SCNC: 140 MMOL/L — SIGNIFICANT CHANGE UP (ref 135–145)
WBC # BLD: 4.84 K/UL — SIGNIFICANT CHANGE UP (ref 3.8–10.5)
WBC # FLD AUTO: 4.84 K/UL — SIGNIFICANT CHANGE UP (ref 3.8–10.5)

## 2023-03-14 PROCEDURE — G0463: CPT

## 2023-03-14 PROCEDURE — 99214 OFFICE O/P EST MOD 30 MIN: CPT | Mod: 25

## 2023-03-14 PROCEDURE — 85027 COMPLETE CBC AUTOMATED: CPT

## 2023-03-14 PROCEDURE — 80048 BASIC METABOLIC PNL TOTAL CA: CPT

## 2023-03-14 PROCEDURE — 51701 INSERT BLADDER CATHETER: CPT

## 2023-03-14 PROCEDURE — 36415 COLL VENOUS BLD VENIPUNCTURE: CPT

## 2023-03-14 NOTE — H&P PST ADULT - PROBLEM SELECTOR PLAN 1
Excision Urethral Diverticulum Cystoscopy on 3/30/23  Pre-op education provided - all questions answered. Pt verbalized understanding

## 2023-03-14 NOTE — H&P PST ADULT - HISTORY OF PRESENT ILLNESS
36 y/o female  pt report urethral diverticulum/abscess found at 7th moth pregnancy she recently underwent  23 followed by emergency ELAP for hemorrhage (s/p PRBC). Pt state diverticulum decreased in size since hospitalization and denies pain at site. Today she presents to PST for scheduled Excision Urethral Diverticulum Cystoscopy on 3/30/23. Denies any palpitations, SOB, N/V, fever or chills.

## 2023-03-14 NOTE — PROCEDURE
[Straight Catheterization] : insertion of a straight catheter [Urinary Tract Infection] : a urinary tract infection [Patient] : the patient [Consent Obtained] : written consent was obtained prior to the procedure and is detailed in the patient's record [Allergies Reviewed] : Allergies reviewed [___ Fr Straight Tip] : a [unfilled] in [place] straight tip catheter [None] : there were no complications with the catheter insertion [Clear] : clear [Culture] : culture [No Complications] : no complications [Tolerated Well] : the patient tolerated the procedure well

## 2023-03-14 NOTE — H&P PST ADULT - SUPRACLAVICULAR R
Date of Service: 04/20/2017    REASON FOR VISIT:  Routine followup.    SUBJECTIVE:  The patient is a 55-year-old female who presents to the clinic for routine followup for generalized anxiety and depression, has been on Zoloft 100 mg p.o. daily and Wellbutrin- mg p.o. daily.  She does take Clonazepam 1 mg p.o. b.i.d. on an as needed basis.  Also takes Ambien 10 mg for her sleep for her insomnia.  Denies any side effects to medications at this time.  For her hypothyroidism, is currently on Synthroid 112 mcg p.o. daily and 1 tablet daily, except she takes 2 tablets on Sunday.  Denies any weight loss, weight gain associated with it.  For her hyperlipidemia, she is currently on Lipitor 20 mg p.o. daily.  Denies any myalgias to medications at this time.  She denies any chest pain, shortness of breath, nausea, vomiting at this time.    PHYSICAL EXAMINATION:  GENERAL:  Alert, oriented, not in acute distress.  VITAL SIGNS:  Noted.  HEART:  S1, S2, regular.  LUNGS:  Clear bilaterally.  ABDOMEN:  Benign.  EXTREMITIES:  No edema.    ASSESSMENT AND PLAN:  Anxiety disorder and depression, symptoms under control.  Continue current meds.  The patient to continue current dose of Klonopin at this time.  Did discuss the new guidelines for controlled substances.  The patient's prescriptions will be given only at her appointments.  She needs to come in every 3 months to get her prescriptions refilled.    Hypothyroidism, clinically euthyroid.  Continue current meds.    Hyperlipidemia, doing fine on current meds.  Recent LDL within goal.  Continue current meds.      Prescriptions given to the patient.  Follow up in 3 months or earlier if further concerns.      Dictated By: Delia Taylor MD  Signing Provider: MD AMY Dunaway/faheem ([id number])  DD: 05/07/2017 22:15:53 TD: 05/08/2017 10:21:27    Copy Sent To:    normal

## 2023-03-15 PROBLEM — U07.1 COVID-19: Chronic | Status: ACTIVE | Noted: 2023-03-14

## 2023-03-15 PROBLEM — N36.1 URETHRAL DIVERTICULUM: Chronic | Status: ACTIVE | Noted: 2023-03-14

## 2023-03-16 LAB — BACTERIA UR CULT: NORMAL

## 2023-03-16 NOTE — HISTORY OF PRESENT ILLNESS
[FreeTextEntry1] : \par Aleyda is a 36 yo  who presents for follow up on urethral diverticulum/abscess. She presented with this in pregnancy and then had an I&D of this during her pregnancy. Since then, she has delivered via C/S on 2023, complicated by hemorrhage requiring ICU care and ex-lap. Her last I&D was on 2/15/2023 after delivery with 2cc extracted. Patient reports that for the last 2 weeks she has not noticed any further discharge or palpated any cyst. \par

## 2023-03-16 NOTE — DISCUSSION/SUMMARY
[FreeTextEntry1] : \stef Maynard is a 36 yo who presents today for follow up on urethral diverticulum. Discussed above findings with patient and recommended for patient to return before scheduled surgery date to evaluate if the diverticulum/cyst is present prior to proceeding for surgery. She underwent CT with contrast during her hospital admission. Will ask radiology to re-review images to assess cyst. \par \par  Risks and benefits of the procedure were discussed and included but not limited to bleeding, infection, failure,  dyspareunia, damage to other organs (urethra, vagina, bladder), developing chronic pelvic pain, and urinary retention. We discussed going home with a catheter for up to several weeks depending on the extent of the surgery and the proximity of the excision to the urethra. Hospital stay, postoperative restrictions, and anesthesia were discussed. All questions were answered and she wishes to proceed with surgical correction. Consent was signed in the office.\par \par Patient signed consent for: pelvic exam under anesthesia, urethral diverticulum, cystoscopy. Pt understands that pelvic exam under anesthesia can be performed by learners (medical students/residents/fellows).\par \par Patient to return on 3/28/2023 for repeat examination

## 2023-03-16 NOTE — PHYSICAL EXAM
[Chaperone Present] : A chaperone was present in the examining room during all aspects of the physical examination [Labia Majora] : were normal [Labia Minora] : were normal [Normal Appearance] : general appearance was normal [Normal] : no abnormalities [Exam Deferred] : was deferred [FreeTextEntry1] : General: Well, appearing. Alert and orientated. No acute distress\par HEENT: Normocephalic, atraumatic and extraocular muscles appear to be intact \par Neck: Full range of motion, no obvious lymphadenopathy, deformities, or masses noted \par Respiratory: Speaking in full sentences comfortably, normal work of breathing and no cough during visit\par Musculoskeletal: active full range of motion in extremities \par Extremities: No upper extremity edema noted\par Skin: no obvious rash or skin lesions\par Neuro: Orientated X 3, speech is fluent, normal rate\par Psych: Normal mood and affect\par  [FreeTextEntry3] : bulbous urethra noted; on palpation - no extraction of discharge or palpable cyst  [de-identified] : No prolapse noted

## 2023-03-16 NOTE — END OF VISIT
[] : Fellow
Notify MD if any increased work of breathing, color change: pale or blue tinged around mouth or lips, lethargy, fever above 100. 4 F, vomiting, diarrhea, decreased wet diapers or other complaints.

## 2023-03-17 ENCOUNTER — APPOINTMENT (OUTPATIENT)
Dept: OBGYN | Facility: CLINIC | Age: 38
End: 2023-03-17

## 2023-03-20 ENCOUNTER — APPOINTMENT (OUTPATIENT)
Dept: UROGYNECOLOGY | Facility: CLINIC | Age: 38
End: 2023-03-20

## 2023-03-23 NOTE — OB RN INTRAOPERATIVE NOTE - NS_PROVIDERPREP_OBGYN_ALL_OB
Refill Request on:     Disp Refills Start End    atorvastatin (LIPITOR) 80 MG tablet 90 tablet 1 9/23/2022     Sig - Route: Take 1 tablet by mouth daily. - Oral    Sent to pharmacy as: Atorvastatin Calcium 80 MG Oral Tablet (LIPITOR)    Class: Eprescribe      Last OV: 1/26/23  Next OV: 8/17/23  PDMP reviewed: no     Medication refilled.   Yes

## 2023-03-28 ENCOUNTER — APPOINTMENT (OUTPATIENT)
Dept: UROGYNECOLOGY | Facility: CLINIC | Age: 38
End: 2023-03-28
Payer: COMMERCIAL

## 2023-03-28 ENCOUNTER — APPOINTMENT (OUTPATIENT)
Dept: OBGYN | Facility: CLINIC | Age: 38
End: 2023-03-28
Payer: COMMERCIAL

## 2023-03-28 ENCOUNTER — LABORATORY RESULT (OUTPATIENT)
Age: 38
End: 2023-03-28

## 2023-03-28 VITALS
SYSTOLIC BLOOD PRESSURE: 103 MMHG | DIASTOLIC BLOOD PRESSURE: 71 MMHG | HEIGHT: 61 IN | HEART RATE: 60 BPM | WEIGHT: 110 LBS | BODY MASS INDEX: 20.77 KG/M2

## 2023-03-28 DIAGNOSIS — N34.0 URETHRAL ABSCESS: ICD-10-CM

## 2023-03-28 PROCEDURE — 99214 OFFICE O/P EST MOD 30 MIN: CPT

## 2023-03-28 PROCEDURE — 36415 COLL VENOUS BLD VENIPUNCTURE: CPT

## 2023-03-28 PROCEDURE — 0503F POSTPARTUM CARE VISIT: CPT

## 2023-03-28 NOTE — END OF VISIT
[FreeTextEntry3] : I, Rachael Zee, acted solely as a scribe for Dr. Nela Ruiz MD., on 03/28/2023.\par \par All medical record entries made by the scribe were at my, Dr. Nela Ruiz MD., direction and personally dictated by me on 03/28/2023. I have personally reviewed the chart and agree that the record accurately reflects my personal performance of the history, physical exam, assessment and plan.\par

## 2023-03-28 NOTE — HISTORY OF PRESENT ILLNESS
[Delivery Date: ___] : on [unfilled] [Male] : Delivery History: baby boy [Clean/Dry/Intact] : clean, dry and intact [Healed] : healed [Intact] : was intact [Normal Skin] : normal appearance [Back to Normal] : is back to normal in size [Normal] : the vagina was normal [Examination Of The Breasts] : breasts are normal [Doing Well] : is doing well [None] : None [Breastfeeding] : not currently nursing [de-identified] : Dr Ruiz C/S with pp Ex Lap for hemorrhage  [de-identified] : condoms for contraception, TSH bloods done, RTO 3 months for annual

## 2023-03-29 LAB
T3 SERPL-MCNC: 110 NG/DL
T3RU NFR SERPL: 1.1 TBI
T4 FREE SERPL-MCNC: 1.1 NG/DL
T4 SERPL-MCNC: 7.3 UG/DL
TSH SERPL-ACNC: 1.89 UIU/ML

## 2023-03-29 NOTE — DISCUSSION/SUMMARY
[FreeTextEntry1] : \stef Maynard is a 36 yo who presents today for follow up on urethral diverticulum/abscess. We discussed the above findings with patient and in the setting of being unable to palpate/visualize a cyst/abscess for the last several weeks; pt would like to cancel the surgery at this time. We discussed re-accumulation could occur in the future and recommended MRI pelvis to be obtained for further evaluation. Rx provided and will follow up with patient on results. We discussed that she may need cystoscopy in the future and will proceed with surgical planning if needed in the future. All questions answered. PT instructed to call office if any further questions/concerns arise.

## 2023-03-29 NOTE — PHYSICAL EXAM
[Chaperone Present] : A chaperone was present in the examining room during all aspects of the physical examination [Labia Majora] : were normal [Labia Minora] : were normal [Normal Appearance] : general appearance was normal [Normal] : no abnormalities [Exam Deferred] : was deferred [FreeTextEntry1] : General: Well, appearing. Alert and orientated. No acute distress\par HEENT: Normocephalic, atraumatic and extraocular muscles appear to be intact \par Neck: Full range of motion, no obvious lymphadenopathy, deformities, or masses noted \par Respiratory: Speaking in full sentences comfortably, normal work of breathing and no cough during visit\par Musculoskeletal: active full range of motion in extremities \par Extremities: No upper extremity edema noted\par Skin: no obvious rash or skin lesions\par Neuro: Orientated X 3, speech is fluent, normal rate\par Psych: Normal mood and affect\par  [FreeTextEntry3] : bulbous urethra noted; on palpation - no extraction of discharge or palpable cyst  [de-identified] : No prolapse noted

## 2023-03-29 NOTE — HISTORY OF PRESENT ILLNESS
[FreeTextEntry1] : \stef Maynard is a 38 yo  who presents for follow up on urethral diverticulum/abscess. She presented with this in pregnancy and then had an I&D of this during her pregnancy. Since then, she has delivered via C/S on 2023, complicated by hemorrhage requiring ICU care and ex-lap. Her last I&D was on 2/15/2023 after delivery with 2cc extracted. Patient reports that for the last 4 weeks she has not noticed any further discharge or palpated any cyst. Denies incontinence, discharge, post void dribbling. Reports that she has been doing well. \par

## 2023-03-30 ENCOUNTER — APPOINTMENT (OUTPATIENT)
Dept: UROGYNECOLOGY | Facility: HOSPITAL | Age: 38
End: 2023-03-30

## 2023-03-31 ENCOUNTER — APPOINTMENT (OUTPATIENT)
Dept: OBGYN | Facility: CLINIC | Age: 38
End: 2023-03-31

## 2023-04-12 ENCOUNTER — NON-APPOINTMENT (OUTPATIENT)
Age: 38
End: 2023-04-12

## 2023-04-19 ENCOUNTER — APPOINTMENT (OUTPATIENT)
Dept: UROGYNECOLOGY | Facility: CLINIC | Age: 38
End: 2023-04-19

## 2023-04-19 ENCOUNTER — NON-APPOINTMENT (OUTPATIENT)
Age: 38
End: 2023-04-19

## 2023-05-24 ENCOUNTER — NON-APPOINTMENT (OUTPATIENT)
Age: 38
End: 2023-05-24

## 2023-05-24 RX ORDER — AMOXICILLIN AND CLAVULANATE POTASSIUM 875; 125 MG/1; MG/1
875-125 TABLET, COATED ORAL
Qty: 14 | Refills: 0 | Status: ACTIVE | COMMUNITY
Start: 2022-11-07 | End: 1900-01-01

## 2023-05-26 ENCOUNTER — NON-APPOINTMENT (OUTPATIENT)
Age: 38
End: 2023-05-26

## 2023-06-14 ENCOUNTER — APPOINTMENT (OUTPATIENT)
Dept: OBGYN | Facility: CLINIC | Age: 38
End: 2023-06-14

## 2023-06-20 ENCOUNTER — APPOINTMENT (OUTPATIENT)
Dept: UROGYNECOLOGY | Facility: CLINIC | Age: 38
End: 2023-06-20
Payer: COMMERCIAL

## 2023-06-20 VITALS
HEIGHT: 61 IN | DIASTOLIC BLOOD PRESSURE: 70 MMHG | HEART RATE: 49 BPM | BODY MASS INDEX: 20.96 KG/M2 | WEIGHT: 111 LBS | SYSTOLIC BLOOD PRESSURE: 111 MMHG

## 2023-06-20 DIAGNOSIS — N36.1 URETHRAL DIVERTICULUM: ICD-10-CM

## 2023-06-20 PROCEDURE — 99214 OFFICE O/P EST MOD 30 MIN: CPT

## 2023-06-22 NOTE — DISCUSSION/SUMMARY
[FreeTextEntry1] : -Will tentatively schedule patient for 7/6/2023 for urethral diverticulectomy/cystoscopy.\par -Pt to undergo MRI pelvis next week.\par -Pt instructed to not to manually drain the diverticulum prior to surgery\par -Patient verbalized understanding.  All questions answered.\par

## 2023-06-22 NOTE — HISTORY OF PRESENT ILLNESS
[FreeTextEntry1] : Aleyda is a 37F w/ known urethral diverticulum/vaginal wall abscess here for followup.  She presenting during pregnancy, during which she had an I&D of the urethral diverticulum/vaginal wall abscess.  The mass subsequently reaccumulated.  She ultimately delivered via C/S on 2/4/2023, complicated by hemorrhage requiring ICU care and ex-lap. Her last I&D was on 2/15/2023 after delivery with 2cc extracted.  Patient reports the abscess continues to wax and wane in size.  Two weeks ago, she noticed that the mass became enlarged but then began to drain brown discharge, causing it to shrink in size.  The mass is painful, particularly when she walks. She denies fever, dysuria, incontinence.  She has MRI of the pelvis scheduled for 6/26/2023.

## 2023-06-22 NOTE — PHYSICAL EXAM
[Chaperone Present] : A chaperone was present in the examining room during all aspects of the physical examination [Labia Majora] : were normal [Labia Minora] : were normal [Normal] : was normal [Normal Appearance] : general appearance was normal [FreeTextEntry1] : General: Well appearing. Alert and oriented. No acute distress. \par HEENT: Normocephalic, atraumatic, extraocular muscles appear to be intact. \par Neck: Full range of motion, no obvious lymphadenopathy, deformities, or masses noted.\par Respiratory: Speaking in full sentences comfortably. Normal work of breathing. No cough during visit. \par Musculoskeletal: Active full range of motion in extremities. \par Skin: No obvious rash or skin lesions. \par Neuro: Oriented x3. Speech is fluent, normal rate. \par Psych: Normal mood and affect. \par \par  [FreeTextEntry3] : 2 cm sub urethral mass draining clear brown fluid

## 2023-06-30 ENCOUNTER — NON-APPOINTMENT (OUTPATIENT)
Age: 38
End: 2023-06-30

## 2023-07-03 ENCOUNTER — OUTPATIENT (OUTPATIENT)
Dept: OUTPATIENT SERVICES | Facility: HOSPITAL | Age: 38
LOS: 1 days | End: 2023-07-03
Payer: COMMERCIAL

## 2023-07-03 VITALS
OXYGEN SATURATION: 100 % | WEIGHT: 119.93 LBS | RESPIRATION RATE: 14 BRPM | HEIGHT: 61 IN | TEMPERATURE: 98 F | SYSTOLIC BLOOD PRESSURE: 108 MMHG | DIASTOLIC BLOOD PRESSURE: 71 MMHG | HEART RATE: 69 BPM

## 2023-07-03 DIAGNOSIS — Z98.891 HISTORY OF UTERINE SCAR FROM PREVIOUS SURGERY: Chronic | ICD-10-CM

## 2023-07-03 DIAGNOSIS — N36.1 URETHRAL DIVERTICULUM: ICD-10-CM

## 2023-07-03 DIAGNOSIS — N34.0 URETHRAL ABSCESS: ICD-10-CM

## 2023-07-03 DIAGNOSIS — Z98.890 OTHER SPECIFIED POSTPROCEDURAL STATES: Chronic | ICD-10-CM

## 2023-07-03 LAB
ANION GAP SERPL CALC-SCNC: 11 MMOL/L — SIGNIFICANT CHANGE UP (ref 5–17)
BUN SERPL-MCNC: 18 MG/DL — SIGNIFICANT CHANGE UP (ref 7–23)
CALCIUM SERPL-MCNC: 8.9 MG/DL — SIGNIFICANT CHANGE UP (ref 8.4–10.5)
CHLORIDE SERPL-SCNC: 103 MMOL/L — SIGNIFICANT CHANGE UP (ref 96–108)
CO2 SERPL-SCNC: 26 MMOL/L — SIGNIFICANT CHANGE UP (ref 22–31)
CREAT SERPL-MCNC: 0.77 MG/DL — SIGNIFICANT CHANGE UP (ref 0.5–1.3)
EGFR: 102 ML/MIN/1.73M2 — SIGNIFICANT CHANGE UP
GLUCOSE SERPL-MCNC: 83 MG/DL — SIGNIFICANT CHANGE UP (ref 70–99)
HCT VFR BLD CALC: 38 % — SIGNIFICANT CHANGE UP (ref 34.5–45)
HGB BLD-MCNC: 12 G/DL — SIGNIFICANT CHANGE UP (ref 11.5–15.5)
MCHC RBC-ENTMCNC: 27.6 PG — SIGNIFICANT CHANGE UP (ref 27–34)
MCHC RBC-ENTMCNC: 31.6 GM/DL — LOW (ref 32–36)
MCV RBC AUTO: 87.6 FL — SIGNIFICANT CHANGE UP (ref 80–100)
NRBC # BLD: 0 /100 WBCS — SIGNIFICANT CHANGE UP (ref 0–0)
PLATELET # BLD AUTO: 267 K/UL — SIGNIFICANT CHANGE UP (ref 150–400)
POTASSIUM SERPL-MCNC: 4.2 MMOL/L — SIGNIFICANT CHANGE UP (ref 3.5–5.3)
POTASSIUM SERPL-SCNC: 4.2 MMOL/L — SIGNIFICANT CHANGE UP (ref 3.5–5.3)
RBC # BLD: 4.34 M/UL — SIGNIFICANT CHANGE UP (ref 3.8–5.2)
RBC # FLD: 14 % — SIGNIFICANT CHANGE UP (ref 10.3–14.5)
SODIUM SERPL-SCNC: 140 MMOL/L — SIGNIFICANT CHANGE UP (ref 135–145)
WBC # BLD: 7.62 K/UL — SIGNIFICANT CHANGE UP (ref 3.8–10.5)
WBC # FLD AUTO: 7.62 K/UL — SIGNIFICANT CHANGE UP (ref 3.8–10.5)

## 2023-07-03 PROCEDURE — 80048 BASIC METABOLIC PNL TOTAL CA: CPT

## 2023-07-03 PROCEDURE — G0463: CPT

## 2023-07-03 PROCEDURE — 85027 COMPLETE CBC AUTOMATED: CPT

## 2023-07-03 NOTE — H&P PST ADULT - NSANTHOSAYNRD_GEN_A_CORE
No. JUSTIN screening performed.  STOP BANG Legend: 0-2 = LOW Risk; 3-4 = INTERMEDIATE Risk; 5-8 = HIGH Risk negative Sleep Study, 3 years ago5/No. JUSTIN screening performed.  STOP BANG Legend: 0-2 = LOW Risk; 3-4 = INTERMEDIATE Risk; 5-8 = HIGH Risk

## 2023-07-03 NOTE — H&P PST ADULT - HISTORY OF PRESENT ILLNESS
Mrs. Canas is a 37 year old woman with known urethral diverticulum and vaginal wall abscess s/p I&D during her last pregnancy, then the abscess reaccumulated.  She had  2023 which was complicated with hemorrhage requiring ICU and an exploratory lap.  Her last I&D was on 2/15/2023.  Since discharge the abscess has waxed and waned when in mid 2023 she notice the mass became enlarged with brown discharge.  She c/o of pain in the area especially when she walks.  MRI was scheduled for 2023 however she has been having difficulty with insurance covering the exam.  She is scheduled for excision of urethral diverticulum, cystoscopy on 2023.

## 2023-07-03 NOTE — H&P PST ADULT - PROBLEM SELECTOR PLAN 1
Scheduled for excision of urethral diverticulum, cystoscopy on 7/6/2023.  Labs sent: cbc, bmp, urine cx  Preop instructions given  Urine for pregnancy upon arrival to [facility] Scheduled for excision of urethral diverticulum, cystoscopy on 7/6/2023.  Labs sent: cbc, bmp  Preop instructions given  Urine for pregnancy upon arrival to [facility]

## 2023-07-03 NOTE — H&P PST ADULT - NSICDXPASTMEDICALHX_GEN_ALL_CORE_FT
PAST MEDICAL HISTORY:  2019 novel coronavirus disease (COVID-19)     History of drainage of abscess     Urethral diverticulum

## 2023-07-03 NOTE — H&P PST ADULT - ASSESSMENT
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

## 2023-07-05 RX ORDER — FLUCONAZOLE 150 MG/1
150 TABLET ORAL
Qty: 1 | Refills: 0 | Status: ACTIVE | COMMUNITY
Start: 2023-07-05 | End: 1900-01-01

## 2023-07-06 ENCOUNTER — APPOINTMENT (OUTPATIENT)
Dept: UROGYNECOLOGY | Facility: HOSPITAL | Age: 38
End: 2023-07-06

## 2023-07-06 ENCOUNTER — NON-APPOINTMENT (OUTPATIENT)
Age: 38
End: 2023-07-06

## 2023-07-06 ENCOUNTER — OUTPATIENT (OUTPATIENT)
Dept: OUTPATIENT SERVICES | Facility: HOSPITAL | Age: 38
LOS: 1 days | End: 2023-07-06
Payer: COMMERCIAL

## 2023-07-06 DIAGNOSIS — Z98.891 HISTORY OF UTERINE SCAR FROM PREVIOUS SURGERY: Chronic | ICD-10-CM

## 2023-07-06 DIAGNOSIS — Z98.890 OTHER SPECIFIED POSTPROCEDURAL STATES: Chronic | ICD-10-CM

## 2023-07-06 PROCEDURE — 87086 URINE CULTURE/COLONY COUNT: CPT

## 2023-07-07 PROBLEM — Z98.890 OTHER SPECIFIED POSTPROCEDURAL STATES: Chronic | Status: ACTIVE | Noted: 2023-07-03

## 2023-07-09 RX ORDER — FLUCONAZOLE 150 MG/1
150 TABLET ORAL
Qty: 1 | Refills: 0 | Status: ACTIVE | COMMUNITY
Start: 2023-07-09 | End: 1900-01-01

## 2023-07-11 DIAGNOSIS — N34.0 URETHRAL ABSCESS: ICD-10-CM

## 2023-07-12 ENCOUNTER — TRANSCRIPTION ENCOUNTER (OUTPATIENT)
Age: 38
End: 2023-07-12

## 2023-07-13 ENCOUNTER — APPOINTMENT (OUTPATIENT)
Dept: UROGYNECOLOGY | Facility: HOSPITAL | Age: 38
End: 2023-07-13
Payer: COMMERCIAL

## 2023-07-13 ENCOUNTER — RESULT REVIEW (OUTPATIENT)
Age: 38
End: 2023-07-13

## 2023-07-13 ENCOUNTER — INPATIENT (INPATIENT)
Facility: HOSPITAL | Age: 38
LOS: 0 days | Discharge: HOME | DRG: 672 | End: 2023-07-14
Attending: STUDENT IN AN ORGANIZED HEALTH CARE EDUCATION/TRAINING PROGRAM | Admitting: STUDENT IN AN ORGANIZED HEALTH CARE EDUCATION/TRAINING PROGRAM
Payer: COMMERCIAL

## 2023-07-13 VITALS
OXYGEN SATURATION: 100 % | HEIGHT: 61 IN | RESPIRATION RATE: 18 BRPM | WEIGHT: 119.93 LBS | DIASTOLIC BLOOD PRESSURE: 69 MMHG | TEMPERATURE: 98 F | HEART RATE: 56 BPM | SYSTOLIC BLOOD PRESSURE: 103 MMHG

## 2023-07-13 DIAGNOSIS — N34.0 URETHRAL ABSCESS: ICD-10-CM

## 2023-07-13 DIAGNOSIS — N36.1 URETHRAL DIVERTICULUM: ICD-10-CM

## 2023-07-13 DIAGNOSIS — Z98.891 HISTORY OF UTERINE SCAR FROM PREVIOUS SURGERY: Chronic | ICD-10-CM

## 2023-07-13 DIAGNOSIS — Z98.890 OTHER SPECIFIED POSTPROCEDURAL STATES: Chronic | ICD-10-CM

## 2023-07-13 LAB — HCG UR QL: NEGATIVE — SIGNIFICANT CHANGE UP

## 2023-07-13 PROCEDURE — 88305 TISSUE EXAM BY PATHOLOGIST: CPT | Mod: 26

## 2023-07-13 PROCEDURE — 53230 REMOVAL OF URETHRA LESION: CPT

## 2023-07-13 DEVICE — SURGIFLO MATRIX WITH THROMBIN KIT: Type: IMPLANTABLE DEVICE | Status: FUNCTIONAL

## 2023-07-13 RX ORDER — SODIUM CHLORIDE 9 MG/ML
1000 INJECTION, SOLUTION INTRAVENOUS
Refills: 0 | Status: DISCONTINUED | OUTPATIENT
Start: 2023-07-13 | End: 2023-07-14

## 2023-07-13 RX ORDER — ONDANSETRON 8 MG/1
4 TABLET, FILM COATED ORAL ONCE
Refills: 0 | Status: DISCONTINUED | OUTPATIENT
Start: 2023-07-13 | End: 2023-07-14

## 2023-07-13 RX ORDER — KETOROLAC TROMETHAMINE 30 MG/ML
15 SYRINGE (ML) INJECTION EVERY 6 HOURS
Refills: 0 | Status: DISCONTINUED | OUTPATIENT
Start: 2023-07-13 | End: 2023-07-13

## 2023-07-13 RX ORDER — SIMETHICONE 80 MG/1
80 TABLET, CHEWABLE ORAL EVERY 6 HOURS
Refills: 0 | Status: DISCONTINUED | OUTPATIENT
Start: 2023-07-13 | End: 2023-07-14

## 2023-07-13 RX ORDER — KETOROLAC TROMETHAMINE 30 MG/ML
15 SYRINGE (ML) INJECTION EVERY 8 HOURS
Refills: 0 | Status: DISCONTINUED | OUTPATIENT
Start: 2023-07-13 | End: 2023-07-14

## 2023-07-13 RX ORDER — ONDANSETRON 8 MG/1
4 TABLET, FILM COATED ORAL EVERY 6 HOURS
Refills: 0 | Status: DISCONTINUED | OUTPATIENT
Start: 2023-07-13 | End: 2023-07-14

## 2023-07-13 RX ORDER — ACETAMINOPHEN 500 MG
975 TABLET ORAL EVERY 6 HOURS
Refills: 0 | Status: DISCONTINUED | OUTPATIENT
Start: 2023-07-13 | End: 2023-07-14

## 2023-07-13 RX ORDER — OXYCODONE HYDROCHLORIDE 5 MG/1
5 TABLET ORAL EVERY 6 HOURS
Refills: 0 | Status: DISCONTINUED | OUTPATIENT
Start: 2023-07-13 | End: 2023-07-14

## 2023-07-13 RX ORDER — HYDROMORPHONE HYDROCHLORIDE 2 MG/ML
0.5 INJECTION INTRAMUSCULAR; INTRAVENOUS; SUBCUTANEOUS
Refills: 0 | Status: DISCONTINUED | OUTPATIENT
Start: 2023-07-13 | End: 2023-07-14

## 2023-07-13 RX ORDER — SENNA PLUS 8.6 MG/1
1 TABLET ORAL AT BEDTIME
Refills: 0 | Status: DISCONTINUED | OUTPATIENT
Start: 2023-07-13 | End: 2023-07-14

## 2023-07-13 RX ADMIN — HYDROMORPHONE HYDROCHLORIDE 0.5 MILLIGRAM(S): 2 INJECTION INTRAMUSCULAR; INTRAVENOUS; SUBCUTANEOUS at 22:11

## 2023-07-13 RX ADMIN — SODIUM CHLORIDE 75 MILLILITER(S): 9 INJECTION, SOLUTION INTRAVENOUS at 21:00

## 2023-07-13 RX ADMIN — HYDROMORPHONE HYDROCHLORIDE 0.5 MILLIGRAM(S): 2 INJECTION INTRAMUSCULAR; INTRAVENOUS; SUBCUTANEOUS at 21:21

## 2023-07-13 RX ADMIN — HYDROMORPHONE HYDROCHLORIDE 0.5 MILLIGRAM(S): 2 INJECTION INTRAMUSCULAR; INTRAVENOUS; SUBCUTANEOUS at 21:45

## 2023-07-13 RX ADMIN — Medication 15 MILLIGRAM(S): at 20:59

## 2023-07-13 RX ADMIN — Medication 15 MILLIGRAM(S): at 21:19

## 2023-07-13 RX ADMIN — HYDROMORPHONE HYDROCHLORIDE 0.5 MILLIGRAM(S): 2 INJECTION INTRAMUSCULAR; INTRAVENOUS; SUBCUTANEOUS at 21:15

## 2023-07-13 NOTE — ASU PATIENT PROFILE, ADULT - FALL HARM RISK - UNIVERSAL INTERVENTIONS
Bed in lowest position, wheels locked, appropriate side rails in place/Call bell, personal items and telephone in reach/Instruct patient to call for assistance before getting out of bed or chair/Non-slip footwear when patient is out of bed/[place] to call system/Physically safe environment - no spills, clutter or unnecessary equipment/Purposeful Proactive Rounding/Room/bathroom lighting operational, light cord in reach

## 2023-07-13 NOTE — PRE-ANESTHESIA EVALUATION ADULT - NSANTHTIREDRD_ENT_A_CORE
[Home] : at home, [unfilled] , at the time of the visit. [Medical Office: ([facility])___] : at the medical office located in  [Verbal consent obtained from patient] : the patient, [unfilled] No

## 2023-07-13 NOTE — PRE-ANESTHESIA EVALUATION ADULT - NSANTHOSAYNRD_GEN_A_CORE
negative Sleep Study, 3 years ago5/No. JUSTIN screening performed.  STOP BANG Legend: 0-2 = LOW Risk; 3-4 = INTERMEDIATE Risk; 5-8 = HIGH Risk

## 2023-07-13 NOTE — CHART NOTE - NSCHARTNOTEFT_GEN_A_CORE
R1 OBGYN POST-OP CHECK    S: Patient seen and evaluated at bedside.  Pt awake and alert resting comfortably in bed  Patient reports pain controlled with analgesia. Pt denies N/V, SOB, CP, palpitations, fever/chills. Tolerating clears.  Not OOB yet.    O:   T(C): 36.5 (07-13-23 @ 20:35), Max: 36.5 (07-13-23 @ 20:35)  HR: 87 (07-13-23 @ 22:00) (84 - 114)  BP: 93/53 (07-13-23 @ 22:00) (93/53 - 109/57)  RR: 16 (07-13-23 @ 22:00) (16 - 16)  SpO2: 99% (07-13-23 @ 22:00) (99% - 100%)  Wt(kg): --  I&O's Summary    13 Jul 2023 07:01  -  13 Jul 2023 23:21  --------------------------------------------------------  IN: 75 mL / OUT: 45 mL / NET: 30 mL        Gen: Resting comfortably in bed, NAD  CV: S1S2, RRR  Lungs: CTA B/L  Abd: soft, nontender, occasional BS x 4 quadrants.    : minimal bleeding on pad. Murry in place  Ext: SCD's in place and functional, non-tender b/l, no edema        A/P: 37y Female s/p excision of urethral diverticulum, cystoscopy, . Patient is clinically stable    Neuro: PO Analgesia PRN  CV: Hemodynamically stable.  Monitor VS. H/H in AM.   Pulm: Saturating well on room air.  Encourage OOB and incentive spirometer use.   GI: Advance to regular diet. Anti-emetics PRN.  : Murry to gravity to remain in place for one week. Leg bag teaching in AM.  FEN: Electrolytes: LR@125cc/hr  Heme: DVT ppx w/ SCD's while in bed. Early ambulation, initially with assistance then as tolerated  ID: Afebrile  Endo: No active issues   Dispo: Patient to be transferred to the floor    Medina Gallo, PGY2  d/w Sofia Gonsales, Urogynecology Fellow

## 2023-07-13 NOTE — BRIEF OPERATIVE NOTE - OPERATION/FINDINGS
Cystoscopy demonstrating ureteral orifices emanating clear efflux b/l. Normal bladder mucosa with no evidence of injury, suture, foreign body, or tumor.  Urethra wnl without any evidence of ostia.

## 2023-07-14 ENCOUNTER — NON-APPOINTMENT (OUTPATIENT)
Age: 38
End: 2023-07-14

## 2023-07-14 ENCOUNTER — TRANSCRIPTION ENCOUNTER (OUTPATIENT)
Age: 38
End: 2023-07-14

## 2023-07-14 VITALS
DIASTOLIC BLOOD PRESSURE: 64 MMHG | RESPIRATION RATE: 18 BRPM | HEART RATE: 68 BPM | TEMPERATURE: 99 F | OXYGEN SATURATION: 99 % | SYSTOLIC BLOOD PRESSURE: 94 MMHG

## 2023-07-14 LAB
HBV SURFACE AG SER-ACNC: SIGNIFICANT CHANGE UP
HCT VFR BLD CALC: 34.1 % — LOW (ref 34.5–45)
HCV RNA SPEC NAA+PROBE-LOG IU: SIGNIFICANT CHANGE UP
HCV RNA SPEC NAA+PROBE-LOG IU: SIGNIFICANT CHANGE UP LOGIU/ML
HGB BLD-MCNC: 11 G/DL — LOW (ref 11.5–15.5)
HIV 1+2 AB+HIV1 P24 AG SERPL QL IA: SIGNIFICANT CHANGE UP

## 2023-07-14 PROCEDURE — 81025 URINE PREGNANCY TEST: CPT

## 2023-07-14 PROCEDURE — C1889: CPT

## 2023-07-14 PROCEDURE — 87522 HEPATITIS C REVRS TRNSCRPJ: CPT

## 2023-07-14 PROCEDURE — 36415 COLL VENOUS BLD VENIPUNCTURE: CPT

## 2023-07-14 PROCEDURE — C9399: CPT

## 2023-07-14 PROCEDURE — 85014 HEMATOCRIT: CPT

## 2023-07-14 PROCEDURE — 85018 HEMOGLOBIN: CPT

## 2023-07-14 PROCEDURE — 88305 TISSUE EXAM BY PATHOLOGIST: CPT

## 2023-07-14 PROCEDURE — 87389 HIV-1 AG W/HIV-1&-2 AB AG IA: CPT

## 2023-07-14 PROCEDURE — 87340 HEPATITIS B SURFACE AG IA: CPT

## 2023-07-14 RX ORDER — TRAMADOL HYDROCHLORIDE 50 MG/1
1 TABLET ORAL
Qty: 5 | Refills: 0
Start: 2023-07-14

## 2023-07-14 RX ORDER — IBUPROFEN 200 MG
4 TABLET ORAL
Qty: 0 | Refills: 0 | DISCHARGE

## 2023-07-14 RX ORDER — ACETAMINOPHEN 500 MG
3 TABLET ORAL
Qty: 0 | Refills: 0 | DISCHARGE
Start: 2023-07-14

## 2023-07-14 RX ORDER — BENZOCAINE AND MENTHOL 5; 1 G/100ML; G/100ML
1 LIQUID ORAL EVERY 8 HOURS
Refills: 0 | Status: DISCONTINUED | OUTPATIENT
Start: 2023-07-14 | End: 2023-07-14

## 2023-07-14 RX ORDER — OXYCODONE HYDROCHLORIDE 5 MG/1
1 TABLET ORAL
Qty: 5 | Refills: 0
Start: 2023-07-14

## 2023-07-14 RX ORDER — AMOXICILLIN 250 MG/5ML
0 SUSPENSION, RECONSTITUTED, ORAL (ML) ORAL
Refills: 0 | DISCHARGE

## 2023-07-14 RX ORDER — KETOROLAC TROMETHAMINE 30 MG/ML
1 SYRINGE (ML) INJECTION
Refills: 0 | DISCHARGE

## 2023-07-14 RX ADMIN — Medication 975 MILLIGRAM(S): at 01:13

## 2023-07-14 RX ADMIN — Medication 15 MILLIGRAM(S): at 07:51

## 2023-07-14 RX ADMIN — Medication 15 MILLIGRAM(S): at 07:20

## 2023-07-14 RX ADMIN — Medication 975 MILLIGRAM(S): at 02:13

## 2023-07-14 RX ADMIN — Medication 975 MILLIGRAM(S): at 11:49

## 2023-07-14 NOTE — DISCHARGE NOTE NURSING/CASE MANAGEMENT/SOCIAL WORK - NSDCPEFALRISK_GEN_ALL_CORE
For information on Fall & Injury Prevention, visit: [email or web address] OR  [email or web address] OR  https://www.cdc.gov/steadi/patient.html

## 2023-07-14 NOTE — DISCHARGE NOTE PROVIDER - NSDCCPCAREPLAN_GEN_ALL_CORE_FT
PRINCIPAL DISCHARGE DIAGNOSIS  Diagnosis: Urethral diverticulum  Assessment and Plan of Treatment:

## 2023-07-14 NOTE — DISCHARGE NOTE PROVIDER - ATTENDING ATTESTATION STATEMENT
Ambulatory I have personally seen and examined the patient. I have collaborated with and supervised the

## 2023-07-14 NOTE — DISCHARGE NOTE PROVIDER - NSDCMRMEDTOKEN_GEN_ALL_CORE_FT
Amoxicillin: take one tab twice a day  Toradol 10 mg oral tablet: 1 tab(s) orally every 6 hours as needed for  severe pain   acetaminophen 325 mg oral tablet: 3 tab(s) orally every 6 hours  ibuprofen 200 mg oral tablet: 4 orally every 8 hours as needed for pain   acetaminophen 325 mg oral tablet: 3 tab(s) orally every 6 hours  ibuprofen 200 mg oral tablet: 4 orally every 8 hours as needed for pain  oxyCODONE 5 mg oral tablet: 1 tab(s) orally every 6 hours as needed for Severe Pain (7 - 10) MDD: 4 tabs   acetaminophen 325 mg oral tablet: 3 tab(s) orally every 6 hours  ibuprofen 200 mg oral tablet: 4 orally every 8 hours as needed for pain  traMADol 50 mg oral tablet: 1 tab(s) orally every 12 hours MDD: 2 tabs

## 2023-07-14 NOTE — PATIENT PROFILE ADULT - FALL HARM RISK - HARM RISK INTERVENTIONS

## 2023-07-14 NOTE — DISCHARGE NOTE PROVIDER - NSDCFUSCHEDAPPT_GEN_ALL_CORE_FT
[facility] Physician [facility]  UROGYN [address]  Scheduled Appointment: 07/25/2023

## 2023-07-14 NOTE — DISCHARGE NOTE PROVIDER - HOSPITAL COURSE
38y/o s/p urethral diverticulectomy, cystourethroscopy  EBL: 200     Hct: 38->34.1  On POD #0, pt was advanced to a regular diet.  On POD#1, pain was well controlled on PO pain meds. Tristan remained in place and urine output was adequate. Patient was discharged in stable condition, ambulating and tolerating PO. Patient was discharged with tristan catheter in situ, and instructions for tristan care outpatient. Pt to have close f/u w/ Dr. Kwan for postoperative check and tristan removal in the office.
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
## 2023-07-14 NOTE — PROGRESS NOTE ADULT - ASSESSMENT
A/P: 36yo POD#1 s/p urethral diverticulectomy, cystourethroscopy. Patient stable and progressing well postoperatively.     Neuro: Transition to PO Tylenol, Motrin, Oxy PRN for pain control  CV: Hemodynamically stable, f/u AM H+H  Pulm: Saturating well on room air, encourage oob/amb  GI: Regular diet as tolerated, Cepacol ordered for sore throat  : UOP adequate overnight (142.5cc/hr), tristan to remain in place for 1-2wk - patient to receive leg bag teaching prior to d/c  Heme: c/w SCDs for DVT ppx  Dispo: anticipate discharge home today    Neha Butler PGY4 A/P: 38yo POD#1 s/p urethral diverticulectomy, cystourethroscopy. Patient stable and progressing well postoperatively.     Neuro: Transition to PO Tylenol, Motrin, Oxy PRN for pain control  CV: Hemodynamically stable, f/u AM H+H  Pulm: Saturating well on room air, encourage oob/amb  GI: Regular diet as tolerated, Cepacol ordered for sore throat  : UOP adequate overnight (142.5cc/hr), tristan to remain in place for 1-2wk - patient to receive leg bag teaching prior to d/c  Heme: c/w SCDs for DVT ppx  Dispo: anticipate discharge home today    Neha Butler PGY4      -----------------------------------  Urogyn Fellow Addendum    Patient seen and examined.  Pain well controlled this morning.  No nausea or vomiting. Tolerating PO intake.    T(C): 36.3 (07-14-23 @ 05:46), Max: 36.9 (07-13-23 @ 14:01)  HR: 62 (07-14-23 @ 05:46) (56 - 114)  BP: 92/50 (07-14-23 @ 05:46) (85/46 - 109/57)  RR: 18 (07-14-23 @ 05:46) (16 - 18)  SpO2: 99% (07-14-23 @ 05:46) (96% - 100%)    Physical Exam  Gen: NAD  HEENT: NCAT, sclera anicteric  Resp: non-labored  Abd: soft, non-distended, non-tender  : tristan draining clear urine; pad with <5 cc blood                          11.0   x     )-----------( x        ( 14 Jul 2023 06:40 )             34.1     A/P: 38yo POD#1 s/p urethral diverticulectomy, cystourethroscopy, doing well.  -maintain tristan  -pain control  -regular diet  -oob/ambulation  -dvt ppx  -f/u labs  -dispo: home    Tammy Ghosh MD  Urogynecology Fellow, PGY-7 A/P: 38yo POD#1 s/p urethral diverticulectomy, cystourethroscopy. Patient stable and progressing well postoperatively.     Neuro: Transition to PO Tylenol, Motrin, Oxy PRN for pain control  CV: Hemodynamically stable, f/u AM H+H  Pulm: Saturating well on room air, encourage oob/amb  GI: Regular diet as tolerated, Cepacol ordered for sore throat  : UOP adequate overnight (142.5cc/hr), tristan to remain in place for 1-2wk - patient to receive leg bag teaching prior to d/c  Heme: c/w SCDs for DVT ppx  Dispo: anticipate discharge home today    Neha Garcianirav PGY4      -----------------------------------  Urogyn Fellow Addendum    Patient seen and examined.  Pain well controlled this morning.  No nausea or vomiting. Tolerating PO intake.    T(C): 36.3 (07-14-23 @ 05:46), Max: 36.9 (07-13-23 @ 14:01)  HR: 62 (07-14-23 @ 05:46) (56 - 114)  BP: 92/50 (07-14-23 @ 05:46) (85/46 - 109/57)  RR: 18 (07-14-23 @ 05:46) (16 - 18)  SpO2: 99% (07-14-23 @ 05:46) (96% - 100%)    Physical Exam  Gen: NAD  HEENT: NCAT, sclera anicteric  Resp: non-labored  Abd: soft, non-distended, non-tender  : tristan draining clear urine; pad with <5 cc blood                          11.0   x     )-----------( x        ( 14 Jul 2023 06:40 )             34.1     A/P: 38yo POD#1 s/p urethral diverticulectomy, cystourethroscopy, doing well.  -maintain tristan  -pain control  -regular diet  -oob/ambulation  -dvt ppx  -f/u labs  -dispo: home    Tammy Ghosh MD  Urogynecology Fellow, PGY-7    Agree with fellow note. Postop instructions and precautions reviewed

## 2023-07-14 NOTE — DISCHARGE NOTE PROVIDER - NSDCCPTREATMENT_GEN_ALL_CORE_FT
PRINCIPAL PROCEDURE  Procedure: Cystoscopy, with urethral diverticulectomy  Findings and Treatment:

## 2023-07-14 NOTE — DISCHARGE NOTE PROVIDER - NSDCFUADDINST_GEN_ALL_CORE_FT
Postoperative Instructions    Bowel regimen  To avoid constipation and straining, we suggest the following (simultaneously):  1. Colace (stool softener) 100mg, one pill three times a day (breakfast, lunch, dinner). If stool is too soft, decrease to twice a day (breakfast, dinner)  If still constipated, you can add: Miralax once at bedtime  All of these agents can be obtained over the counter at the pharmacy.    Pain control.  For pain control, take the followin.  Motrin 800mg three times a day, take with food  2.  Add Tylenol as needed if still have pain despite Motrin  Motrin and Tylenol can be obtained over the counter.    Postoperative urinary catheter  The tristan catheter will stay in place for 1-2 weeks. Please call the office ([phone]) to schedule an appointment for tristan catheter removal and a trial of void.    Postoperative restrictions  Nothing in the vagina (tampons, sexual intercourse), No tub baths, pools or hot tubs for 6 weeks (showers are ok!)  No lifting anything heavier than 10 lbs, no strenuous exercise for 2 weeks after surgery. Do not pull or cut any stitches that you see around your incision.  Vaginal bleeding  Spotting and intermittent passage of blood clots per vagina is normal in first few weeks after surgery. If you are soaking 1 pad per hour, that is not normal and you should notify my office and seek medical attention right away.    Vaginal discharge  Vaginal discharge (all colors) is normal after vaginal surgery. If you’ve had vaginal surgery, you have sutures in your vagina which take 3 months to fully absorb. You may have vaginal discharge during this time. This is normal.  Postoperative Instructions    Bowel regimen  To avoid constipation and straining, we suggest the following (simultaneously):  1. Colace (stool softener) 100mg, one pill three times a day (breakfast, lunch, dinner). If stool is too soft, decrease to twice a day (breakfast, dinner)  If still constipated, you can add: Miralax once at bedtime  All of these agents can be obtained over the counter at the pharmacy.    Pain control.  For pain control, take the followin.  Motrin 800mg three times a day, take with food  2.  Add Tylenol as needed if still have pain despite Motrin  3. A prescription for Oxycodone has been sent to your pharmacy on file. Please take 1 tablet as prescribed as needed for severe pain unrelieved by Tylenol and Motrin.  Motrin and Tylenol can be obtained over the counter.    Postoperative urinary catheter  The tristan catheter will stay in place for 1-2 weeks. Please call the office ([phone]) to schedule an appointment for tristan catheter removal and a trial of void.    Postoperative restrictions  Nothing in the vagina (tampons, sexual intercourse), No tub baths, pools or hot tubs for 6 weeks (showers are ok!)  No lifting anything heavier than 10 lbs, no strenuous exercise for 2 weeks after surgery. Do not pull or cut any stitches that you see around your incision.  Vaginal bleeding  Spotting and intermittent passage of blood clots per vagina is normal in first few weeks after surgery. If you are soaking 1 pad per hour, that is not normal and you should notify my office and seek medical attention right away.    Vaginal discharge  Vaginal discharge (all colors) is normal after vaginal surgery. If you’ve had vaginal surgery, you have sutures in your vagina which take 3 months to fully absorb. You may have vaginal discharge during this time. This is normal.  Postoperative Instructions    Bowel regimen  To avoid constipation and straining, we suggest the following (simultaneously):  1. Colace (stool softener) 100mg, one pill three times a day (breakfast, lunch, dinner). If stool is too soft, decrease to twice a day (breakfast, dinner)  If still constipated, you can add: Miralax once at bedtime  All of these agents can be obtained over the counter at the pharmacy.    Pain control.  For pain control, take the followin.  Motrin 800mg three times a day, take with food  2.  Add Tylenol as needed if still have pain despite Motrin  3. A prescription for Tramadol has been sent to your pharmacy on file. Please take 1 tablet as prescribed as needed for severe pain unrelieved by Tylenol and Motrin.  Motrin and Tylenol can be obtained over the counter.    Postoperative urinary catheter  The tristan catheter will stay in place for 1-2 weeks. Please call the office ([phone]) to schedule an appointment for tristan catheter removal and a trial of void.    Postoperative restrictions  Nothing in the vagina (tampons, sexual intercourse), No tub baths, pools or hot tubs for 6 weeks (showers are ok!)  No lifting anything heavier than 10 lbs, no strenuous exercise for 2 weeks after surgery. Do not pull or cut any stitches that you see around your incision.  Vaginal bleeding  Spotting and intermittent passage of blood clots per vagina is normal in first few weeks after surgery. If you are soaking 1 pad per hour, that is not normal and you should notify my office and seek medical attention right away.    Vaginal discharge  Vaginal discharge (all colors) is normal after vaginal surgery. If you’ve had vaginal surgery, you have sutures in your vagina which take 3 months to fully absorb. You may have vaginal discharge during this time. This is normal.

## 2023-07-14 NOTE — PROGRESS NOTE ADULT - SUBJECTIVE AND OBJECTIVE BOX
POD#1   HD#2    Patient seen and examined at bedside. No acute overnight events. No acute complaints, pain well controlled. She is passing flatus. Voiding spontaneously. Tolerating regular diet. Not yet OOB. Denies CP, SOB, N/V, fevers, and chills.    Vital Signs Last 24 Hours  T(C): 36.3 (07-14-23 @ 05:46), Max: 36.9 (07-13-23 @ 14:01)  HR: 62 (07-14-23 @ 05:46) (56 - 114)  BP: 92/50 (07-14-23 @ 05:46) (85/46 - 109/57)  RR: 18 (07-14-23 @ 05:46) (16 - 18)  SpO2: 99% (07-14-23 @ 05:46) (96% - 100%)    I&O's Summary    13 Jul 2023 07:01  -  14 Jul 2023 07:00  --------------------------------------------------------  IN: 1345 mL / OUT: 1710 mL / NET: -365 mL        Physical Exam:  General: NAD  CV: RRR  Lungs: CTAB  Abdomen: Soft, non-tender, non-distended, +BS  : Incision c/d/i, no blood staining on pad, no active bleeding, tristan in place draining clear yellow urine  Ext: No pain or swelling    Labs:                        11.0   x     )-----------( x        ( 14 Jul 2023 06:40 )             34.1   baso x      eos x      imm gran x      lymph x      mono x      poly x          MEDICATIONS  (STANDING):  acetaminophen     Tablet .. 975 milliGRAM(s) Oral every 6 hours  ketorolac   Injectable 15 milliGRAM(s) IV Push every 8 hours  lactated ringers. 1000 milliLiter(s) (75 mL/Hr) IV Continuous <Continuous>    MEDICATIONS  (PRN):  ondansetron Injectable 4 milliGRAM(s) IV Push every 6 hours PRN Nausea and/or Vomiting  oxyCODONE    IR 5 milliGRAM(s) Oral every 6 hours PRN Severe Pain (7 - 10)  senna 1 Tablet(s) Oral at bedtime PRN Constipation  simethicone 80 milliGRAM(s) Chew every 6 hours PRN Gas

## 2023-07-14 NOTE — DISCHARGE NOTE NURSING/CASE MANAGEMENT/SOCIAL WORK - PATIENT PORTAL LINK FT
You can access the FollowMyHealth Patient Portal offered by [facility] by registering at the following website: [email or web address]. By joining [employer]’s FollowMyHealth portal, you will also be able to view your health information using other applications (apps) compatible with our system.

## 2023-07-14 NOTE — DISCHARGE NOTE PROVIDER - CARE PROVIDER_API CALL
Raquel Kwan  Female Pelvic Med/Reconst Surg  [address], [address]  [place] [address]  Phone: [phone]  Fax: [phone]  Follow Up Time:

## 2023-07-17 ENCOUNTER — NON-APPOINTMENT (OUTPATIENT)
Age: 38
End: 2023-07-17

## 2023-07-20 LAB — SURGICAL PATHOLOGY STUDY: SIGNIFICANT CHANGE UP

## 2023-07-24 ENCOUNTER — NON-APPOINTMENT (OUTPATIENT)
Age: 38
End: 2023-07-24

## 2023-07-24 ENCOUNTER — APPOINTMENT (OUTPATIENT)
Dept: UROGYNECOLOGY | Facility: CLINIC | Age: 38
End: 2023-07-24
Payer: COMMERCIAL

## 2023-07-24 DIAGNOSIS — Z09 ENCOUNTER FOR FOLLOW-UP EXAMINATION AFTER COMPLETED TREATMENT FOR CONDITIONS OTHER THAN MALIGNANT NEOPLASM: ICD-10-CM

## 2023-07-24 PROCEDURE — 99024 POSTOP FOLLOW-UP VISIT: CPT

## 2023-07-24 RX ORDER — SULFAMETHOXAZOLE AND TRIMETHOPRIM 800; 160 MG/1; MG/1
800-160 TABLET ORAL TWICE DAILY
Qty: 5 | Refills: 0 | Status: ACTIVE | COMMUNITY
Start: 2023-07-24 | End: 1900-01-01

## 2023-07-24 NOTE — SUBJECTIVE
[FreeTextEntry1] : Overall feels well, reports catheter discomfort  [FreeTextEntry8] : denies [FreeTextEntry7] : reports pain is well controlled, minimal discomfort by sutures [FreeTextEntry6] : normal  [FreeTextEntry5] : +tristan, clear urine to leg bag [FreeTextEntry4] : normal  [FreeTextEntry3] : denies any difficulties  [FreeTextEntry2] : denies issues

## 2023-07-24 NOTE — DISCUSSION/SUMMARY
[Post-Op instructions given. Pt/family verbalizes understanding] : post-operative instructions were provided to the patient/family who verbalize understanding [Risks/Benefits discussed. Pt/family verbalizes understanding] : risks and benefits of the procedure were discussed with the patient/family who verbalize understanding [FreeTextEntry1] : 1. Postop state\par -D/w Dr. Kwan will give Bactrim DS x 3 days for UTI prophylaxis \par -Reviewed postop instructions/restrictions, all questions answered\par -Patient doing well, she understands to call the office or go to ER if acute changes \par -F/u in 4 weeks

## 2023-07-24 NOTE — OBJECTIVE
[Voiding Trial] : Voiding trial was performed [Post Void Residual ____ ml] : Post Void Residual was [unfilled] ml [Soft and Nontender] : soft and nontender [Clean, Dry, Intact] : Clean, Dry, Intact [FreeTextEntry1] : 200ml of sterile water instilled into the tristan catheter, tristan was then removed, she was able to urinate 300ml of sterile water  [FreeTextEntry2] : sutures intact

## 2023-09-12 ENCOUNTER — APPOINTMENT (OUTPATIENT)
Dept: UROGYNECOLOGY | Facility: CLINIC | Age: 38
End: 2023-09-12
Payer: COMMERCIAL

## 2023-09-12 DIAGNOSIS — Z09 ENCOUNTER FOR FOLLOW-UP EXAMINATION AFTER COMPLETED TREATMENT FOR CONDITIONS OTHER THAN MALIGNANT NEOPLASM: ICD-10-CM

## 2023-09-12 DIAGNOSIS — N89.8 OTHER SPECIFIED NONINFLAMMATORY DISORDERS OF VAGINA: ICD-10-CM

## 2023-09-12 PROCEDURE — 99024 POSTOP FOLLOW-UP VISIT: CPT

## 2023-09-13 DIAGNOSIS — B96.89 ACUTE VAGINITIS: ICD-10-CM

## 2023-09-13 DIAGNOSIS — N76.0 ACUTE VAGINITIS: ICD-10-CM

## 2023-09-13 DIAGNOSIS — B37.31 ACUTE CANDIDIASIS OF VULVA AND VAGINA: ICD-10-CM

## 2023-09-13 LAB
CANDIDA VAG CYTO: DETECTED
G VAGINALIS+PREV SP MTYP VAG QL MICRO: DETECTED
T VAGINALIS VAG QL WET PREP: NOT DETECTED

## 2023-09-13 RX ORDER — METRONIDAZOLE 7.5 MG/G
0.75 GEL VAGINAL
Qty: 1 | Refills: 0 | Status: ACTIVE | COMMUNITY
Start: 2023-09-13 | End: 1900-01-01

## 2023-09-13 RX ORDER — FLUCONAZOLE 150 MG/1
150 TABLET ORAL
Qty: 2 | Refills: 0 | Status: ACTIVE | COMMUNITY
Start: 2023-09-13 | End: 1900-01-01

## 2023-12-19 ENCOUNTER — APPOINTMENT (OUTPATIENT)
Dept: UROGYNECOLOGY | Facility: CLINIC | Age: 38
End: 2023-12-19

## 2023-12-19 NOTE — HISTORY OF PRESENT ILLNESS
[FreeTextEntry1] : Aleyda is a 37 yo who is s/p urethral diverticulectomy on 7/14/23 and who presents for follow up. At last visit 9/2023, she was recovering well, though she did test positive and was treated for vulvovaginal candidiasis and BV.   Today, she reports

## 2023-12-19 NOTE — PHYSICAL EXAM
[Chaperone Present] : A chaperone was present in the examining room during all aspects of the physical examination [FreeTextEntry1] : General: Well appearing. Alert and oriented. No acute distress. HEENT: Normocephalic, atraumatic, extraocular muscles appear to be intact. Neck: Full range of motion, no obvious lymphadenopathy, deformities, or masses noted. Respiratory: Speaking in full sentences comfortably. Normal work of breathing. No cough during visit. Musculoskeletal: Active full range of motion in extremities. Skin: No obvious rash or skin lesions. Neuro: Oriented x3. Speech is fluent, normal rate. Psych: Normal mood and affect.

## 2023-12-22 NOTE — DISCHARGE NOTE OB - NS OB DC MMR REASON NOT RECEIVED
Please call & inform patient:  1.  See report for details;   -large bilateral inguinal hernias containing fat and bowel without evidence of incarceration  -periumbilical hernia  Recommend f/up gen sx for eval - give Dr. Zamarripa/ Dr. Caceres contact [phone], ref placed  -lesion in right kidney, rec further eval non-urgent US, order placed  -diverticulosis of sigmoid colon - increase dietary fiber/fiber supplement daily  Thank you,  -PETRONA Villavicencio
Contraindicated

## 2024-01-15 NOTE — PATIENT PROFILE ADULT - FUNCTIONAL ASSESSMENT - BASIC MOBILITY 2.
[Well Developed] : well developed [Well Nourished] : well nourished [No Acute Distress] : no acute distress [Normal Conjunctiva] : normal conjunctiva [Normal Venous Pressure] : normal venous pressure [No Carotid Bruit] : no carotid bruit [Normal S1, S2] : normal S1, S2 [No Murmur] : no murmur [No Rub] : no rub [No Gallop] : no gallop [Clear Lung Fields] : clear lung fields [Good Air Entry] : good air entry [No Respiratory Distress] : no respiratory distress  [Soft] : abdomen soft [Non Tender] : non-tender [No Masses/organomegaly] : no masses/organomegaly [Normal Bowel Sounds] : normal bowel sounds 4 = No assist / stand by assistance [Normal Gait] : normal gait [No Edema] : no edema [No Cyanosis] : no cyanosis [No Clubbing] : no clubbing [No Rash] : no rash [Moves all extremities] : moves all extremities [Alert and Oriented] : alert and oriented

## 2024-08-07 NOTE — OB RN DELIVERY SUMMARY - BABY A: APGAR 1 MIN HEART RATE, DELIVERY
Significant improvement of both inflammatory markers noted.  Continue Rinvoq. (2) more than 100 beats/min

## 2024-12-13 NOTE — PRE-ANESTHESIA EVALUATION ADULT - HEART RATE (BEATS/MIN)
Anesthesia Post-op Note    Patient: Lisa Daugherty  Procedure(s) Performed: LAPAROTOMY, EXPLORATORY, POSSIBLE OSTOMY, partial colon resection, retroperitoneal packing, partial omentectomy, ab-thera vac, lip lacerations repair, Pin Insertion, CLosed Reduction of Tibia (Abdomen)  Anesthesia type: General    Vitals Value Taken Time   Temp 37 °C (98.6 °F) 12/13/24 1600   Pulse 138 12/13/24 1600   Resp 22 12/13/24 1600   SpO2 100 % 12/13/24 1600   /66 12/13/24 1600         Patient Location: PACU Phase 1  Level of Consciousness: unresponsive  Respiratory Status: ventilator  Cardiovascular stable  Hydration: euvolemic  Pain Management: well controlled  Vomiting: none  Nausea: None  Airway Patency:intubated  Post-op Assessment: patient tolerated procedure well    No notable events documented.                       60
